# Patient Record
Sex: FEMALE | Race: WHITE | Employment: FULL TIME | URBAN - METROPOLITAN AREA
[De-identification: names, ages, dates, MRNs, and addresses within clinical notes are randomized per-mention and may not be internally consistent; named-entity substitution may affect disease eponyms.]

---

## 2019-07-03 ENCOUNTER — HOSPITAL ENCOUNTER (OUTPATIENT)
Dept: PHYSICAL THERAPY | Age: 59
Discharge: HOME | End: 2019-07-03
Payer: COMMERCIAL

## 2019-07-03 PROCEDURE — 97162 PT EVAL MOD COMPLEX 30 MIN: CPT

## 2019-07-03 PROCEDURE — 97110 THERAPEUTIC EXERCISES: CPT

## 2019-07-03 NOTE — PROGRESS NOTES
Maikel Malcolm [address]  [facility] PHYSICAL THERAPY  [address] [facility] [address], [address] - Phone: [phone]  Fax: [phone] / [address] [facility]  Patient Name: Iris Abraham : 1960   Medical   Diagnosis: Neck pain [M54.2]  Right leg pain [M79.604]  Low back pain [M54.5] Treatment Diagnosis: C/S and L/S radic   Onset Date: Chronic with Exacerbation 2019     Referral Source: Henok Julien MD [facility]): 7/3/2019   Prior Hospitalization: See medical history Provider #: [id number]   Prior Level of Function: Works a teacher, Independent with ADLs   Comorbidities: x2 prior L/S surgeries  and , Migraines    Medications: Verified on Patient Summary List   The Plan of Care and following information is based on the information from the initial evaluation.   ==========================================================================================  Assessment / key information:  Pt is a 61year old female who presents to PT todaywith c/o worsening C/s and L/S since 2019 when she returned to neutral after flipping her head over to dry her hair. Initially pt reported increase in L/S pain which the progressively exacerbated her C/S pain. Pt has a long history of chronic interm spine pain having has x2 prior L/S surgeries in the  and reporting she has been wearing a soft cervical collar interm at night since . Pt also reports interm numbness in her right lateral calf and interm aching in her elbow at night. Pt initially apprehensive of physical therapy stating she was here because her insurance would not approve an MRI and stating \"aren't you afraid to touch me?\" Currently S/S consistent with cervical and lumbar radiculopathy as L/S pain increases with flexion based activities and decreases today with prone lying and lumbar support in sitting.  She is also able to abolish C/S pain with neutral lying in supine. Noted pt with decreased L/S AROM all planes, grossly LE MMT 4+/5 except abd and ext 4/5. C/S AROM flex 45deg, Ext 50deg with pain, Right SB 22deg with pain on right, Left SB 35deg with pull on the right, Right Rotation 60deg and Left Rotatoin 65 deg both with c/s pain. Pt would benefit from skilled PT to address increased pain, decreased strength and ROM, and limited activity tolerance.       FOTO score 52 (L/S) and 47 (C/S) indicating 48%  And 53% functional disability   ==========================================================================================  Eval Complexity: History: MEDIUM  Complexity : 1-2 comorbidities / personal factors will impact the outcome/ POC Exam:MEDIUM Complexity : 3 Standardized tests and measures addressing body structure, function, activity limitation and / or participation in recreation  Presentation: MEDIUM Complexity : Evolving with changing characteristics  Clinical Decision Making:MEDIUM Complexity : FOTO score of 26-74Overall Complexity:MEDIUM    Problem List: pain affecting function, decrease ROM, decrease strength, impaired gait/ balance, decrease ADL/ functional abilitiies, decrease activity tolerance, decrease flexibility/ joint mobility and decrease transfer abilities   Treatment Plan may include any combination of the following: Therapeutic exercise, Therapeutic activities, Neuromuscular re-education, Physical agent/modality, Gait/balance training, Manual therapy, Aquatic therapy, Patient education, Self Care training, Functional mobility training, Home safety training and Stair training  Patient / Family readiness to learn indicated by: asking questions, trying to perform skills and interest  Persons(s) to be included in education: patient (P)  Barriers to Learning/Limitations: None  Measures taken: NA   Patient Goal (s): \"Figure out what's going on\"   Patient self reported health status: fair  Rehabilitation Potential: good   Short Term Goals: To be accomplished in  2  weeks:  1. Pt will be compliant with HEP for symptom management at home. 2. Pt will report >/= 50% improvement in L/S symptoms to indicate increase ease with work tolerance.  Long Term Goals: To be accomplished in  4  weeks:  1. Pt will be independent with HEP at D/C for self management. 2. Pt will increase FS FOTO L/S Score to >/= 59 to indicate a significant decrease in functional disability. 3. Pt will increase FS FOTO C/S Score to >/= 63 to indicate a significant decrease in functional disability. 4. Pt will perform x10 floor to waist box lifts with good mechanics and no increased pain for decreased stress on spine at work. Frequency / Duration:   Patient to be seen  2  times per week for 4  weeks:  Patient / Caregiver education and instruction: self care, activity modification, exercises and other posture    Therapist Signature: Janneth Troy DPT Date: 9/2/1211   Certification Period: NA Time: 12:08 PM   ===========================================================================================  I certify that the above Physical Therapy Services are being furnished while the patient is under my care. I agree with the treatment plan and certify that this therapy is necessary. Physician Signature:        Date:       Time:     Please sign and return to [employer] or you may fax the signed copy to [phone]. Thank you.

## 2019-07-03 NOTE — PROGRESS NOTES
PHYSICAL THERAPY - DAILY TREATMENT NOTE    Patient Name: Deborah Bonilla        Date: 7/3/2019  : 1960   YES Patient  Verified  Visit #:   1   of   8  Insurance: Payor: Ana Paula Meng / Plan: [address] PT / Product Type: Commerical /      In time: 9:05 Out time: 10:15   Total Treatment Time: 70     Medicare/BCBS [place] Time Tracking (below)   Total Timed Codes (min):  15 1:1 Treatment Time:  15     TREATMENT AREA =  C/S, L/S    SUBJECTIVE    Pain Level (on 0 to 10 scale):  5  / 10   Medication Changes/New allergies or changes in medical history, any new surgeries or procedures?     NO    If yes, update Summary List   Subjective Functional Status/Changes:  []  No changes reported     See Eval          OBJECTIVE      15 min Therapeutic Exercise:  [x]  See flow sheet   Rationale:      MDT to improve the patients ability to self manage symptoms and postural education to decrease stress on spine      min Patient Education:  YES  Reviewed HEP   []  Progressed/Changed HEP based on:   Issued HEP     Other Objective/Functional Measures:    See Eval     Post Treatment Pain Level (on 0 to 10) scale:   2  / 10     ASSESSMENT    Assessment/Changes in Function:     Justification for Eval Code Complexity: Moderate  Patient History (low 0, mod 1-2, high 3-4): x2 L/S surgeries, Chronicity   Examination (low 1-2, mod 3+, high 4+): Decreased ROM, Limited positional tolerance, decreased amb tolerance    Clinical Presentation (low: stable/uncomplicated; mod: evolving; high: unstable/unpredictable): Evolving  Clinical Decision Making (low , mod 26-74, high 1-25): FOTO Moderate         []  See Progress Note/Recertification   Patient will continue to benefit from skilled PT services to analyze,, cue,, progress,, modify,, demonstrate,, instruct, and address, movement patterns,, therapeutic interventions,, postural abnormalities,, soft tissue restrictions,, ROM,, strength,, functional mobility,, body mechanics/ergonomics, and home and community integration, to attain remaining goals.    Progress toward goals / Updated goals:    Goals set per POC     PLAN    []  Upgrade activities as tolerated YES Continue plan of care   []  Discharge due to :    []  Other:      Therapist: Rose Aviles DPT    Date: 7/3/2019 Time: 12:01 PM     Future Appointments   Date Time Provider Trace Muñoz   7/24/2019 11:30 AM Santosh Shaw, [facility]   7/26/2019 12:00 PM Katerina Wang, [address] [place] [address]   7/30/2019 10:30 AM Darlene Valdez, PT [facility]

## 2019-07-24 ENCOUNTER — HOSPITAL ENCOUNTER (OUTPATIENT)
Dept: PHYSICAL THERAPY | Age: 59
Discharge: HOME | End: 2019-07-24
Payer: COMMERCIAL

## 2019-07-24 PROCEDURE — 97110 THERAPEUTIC EXERCISES: CPT

## 2019-07-24 NOTE — PROGRESS NOTES
PHYSICAL THERAPY - DAILY TREATMENT NOTE    Patient Name: Donnie Hearn        Date: 2019  : 1960   YES Patient  Verified  Visit #:   2   of   8  Insurance: Payor: Lraon Mention / Plan: [address] PT / Product Type: Commerical /      In time: 11:30 Out time: 12:15   Total Treatment Time: 45     Medicare/BCBS [place] Time Tracking (below)   Total Timed Codes (min):  na 1:1 Treatment Time:  na     TREATMENT AREA =  C/S, L/S    SUBJECTIVE    Pain Level (on 0 to 10 scale):  1  / 10   Medication Changes/New allergies or changes in medical history, any new surgeries or procedures? NO    If yes, update Summary List   Subjective Functional Status/Changes:  []  No changes reported   \"My back and neck have been a little better. No change in the numbness in my foot or the pain in my elbow.  My doctor said I have Tennis elbow\"         OBJECTIVE    45 min Therapeutic Exercise:  [x]  See flow sheet   Rationale:      increase ROM, increase strength and posture to improve the patients ability to perform ADLs with increased ease      min Patient Education:  YES  Reviewed HEP   []  Progressed/Changed HEP based on:   Cont HEP     Other Objective/Functional Measures:  Pts cervical, elbow  And foot numbness all abolished with HL position  (+) extensor wad pain with resisted wrist extension  TTP and extensor wad insertion  Educated (+) onset of tennis elbow likely attributed to radicular RUE symptoms  Educated on extensor wad str and maintaining wrist neutral   Pt reports limited compliance to lumbar roll use     Post Treatment Pain Level (on 0 to 10) scale:   0  / 10     ASSESSMENT    Assessment/Changes in Function:     Minimal reported     []  See Progress Note/Recertification   Patient will continue to benefit from skilled PT services to analyze, cue, progress, modify,, demonstrate, instruct, and address, movement patterns, therapeutic interventions, postural abnormalities, soft tissue restrictions, ROM, strength, functional mobility, body mechanics/ergonomics, and home and community integration, to attain remaining goals.    Progress toward goals / Updated goals:    Not yet fully compliant with HEP     PLAN    []  Upgrade activities as tolerated YES Continue plan of care   []  Discharge due to :    []  Other:      Therapist: Janneth Troy DPT    Date: 7/24/2019 Time: 12:20 PM     Future Appointments   Date Time Provider Trace Wanda   7/26/2019 12:00 PM Meir Goodman [address]   7/30/2019 10:30 AM Conrad Calix [address]   8/2/2019  8:00 AM Meir Goodman, [address]   8/5/2019 11:30 AM Meir Goodman [address]   8/9/2019  9:00 AM Meir Goodman [address]   8/14/2019  9:00 AM Conrad Calix [address]   8/16/2019  9:00 AM Meir Goodman [address]   8/19/2019  2:00 PM Conrad Calix [address]   8/22/2019  9:00 AM Jose Metz, [address]

## 2019-07-26 ENCOUNTER — HOSPITAL ENCOUNTER (OUTPATIENT)
Dept: PHYSICAL THERAPY | Age: 59
Discharge: HOME | End: 2019-07-26
Payer: COMMERCIAL

## 2019-07-26 PROCEDURE — 97110 THERAPEUTIC EXERCISES: CPT

## 2019-07-26 NOTE — PROGRESS NOTES
PHYSICAL THERAPY - DAILY TREATMENT NOTE    Patient Name: Phoebe Brantley        Date: 2019  : 1960   YES Patient  Verified  Visit #:   3   of   8  Insurance: Payor: Jazz De Jesus / Plan: [address] [place] [address] [facility] / Product Type: Commerical /      In time: 11:55 Out time: 12:45   Total Treatment Time: 50     TREATMENT AREA = Neck pain [M54.2]  Right leg pain [M79.604]  Low back pain [M54.5]    SUBJECTIVE    Pain Level (on 0 to 10 scale):  1  / 10   Medication Changes/New allergies or changes in medical history, any new surgeries or procedures? NO    If yes, update Summary List   Subjective Functional Status/Changes:  []  No changes reported     \"My back is better but my neck is the same. It's always a nagging ache. \"   \"I only do the (retractions) once a day. SHould I do them more? \"       OBJECTIVE  Therapeutic Procedures:  Min Procedure Specifics + Rationale   n/a [x]  Patient Education (performed throughout session) [x] Review HEP    [x] Progressed/Changed HEP based on:   [x] proper performance and advancement of Therex/TA   [x] reduction in pain level    [x] increased functional capacity       [] change in directional preference   40 [x] Therapeutic Exercise   [x]  See Flowsheet   Rationale: increase ROM and increase strength to improve the patients ability to participate in ADL's        Modality rationale: decrease inflammation, decrease pain, increase tissue extensibility and increase muscle contraction/control to improve the patients ability to perform ADL's with greater ease     Min Type Additional Details   10 [x]  Cold Pack       [x] post-DARSHAN  []  Ice massage Location:  C/S and L/S  [x] supine              [] prone  [x] legs elevated    [] legs flat   [] sitting   [x] Skin assessment post-treatment:  [x]intact [x]redness- no adverse reaction       []redness  adverse reaction:     Other Objective/Functional Measures:    Educated patient re: use of repeated movements to further address pain control and enhance posture  VC's to maintain form, tempo, and rep count. No worsening of symptoms with treatment progression. Added new therex per flow sheet and advanced HEP     Post Treatment Pain Level (on 0 to 10) scale:   0  / 10     ASSESSMENT    Assessment/Changes in Function:       No worsening of symptoms with treatment progression. Patient will continue to benefit from skilled PT services to modify and progress therapeutic interventions, address functional mobility deficits, address ROM deficits, address strength deficits, analyze and address soft tissue restrictions, analyze and cue movement patterns, analyze and modify body mechanics/ergonomics and instruct in home and community integration  to attain remaining goals   Progress toward goals / Updated goals:    Performing HEP with compliance     PLAN    [x]  Upgrade activities as tolerated  [x]  Update interventions per flow sheet YES Continue plan of care   []  Discharge due to :    []  Other:      Therapist: Bhupinder Levin \"BJ\" Heidi De Jesus, DPT, Cert. MDT, Cert. DN, Cert.  SMT    Date: 7/26/2019 Time: 11:59 AM     Future Appointments   Date Time Provider Trace Muñoz   7/26/2019 12:00 PM Koko Wilkes, [facility]   7/30/2019 10:30 AM Daksha Wright, [facility]   8/2/2019  8:00 AM Koko Wilkes, [facility]   8/5/2019 11:30 AM Koko Wilkes, [facility]   8/9/2019  9:00 AM Koko Wilkes, [facility]   8/14/2019  9:00 AM Daksha Wright, [facility]   8/16/2019  9:00 AM Koko Wilkes, [facility]   8/19/2019  2:00 PM Daksha Wright, [facility]   8/22/2019  9:00 AM Jorge Luis Medrano, [facility]

## 2019-07-30 ENCOUNTER — HOSPITAL ENCOUNTER (OUTPATIENT)
Dept: PHYSICAL THERAPY | Age: 59
Discharge: HOME | End: 2019-07-30
Payer: COMMERCIAL

## 2019-07-30 PROCEDURE — 97110 THERAPEUTIC EXERCISES: CPT

## 2019-07-30 PROCEDURE — 97012 MECHANICAL TRACTION THERAPY: CPT

## 2019-07-30 NOTE — PROGRESS NOTES
PHYSICAL THERAPY - DAILY TREATMENT NOTE    Patient Name: Iris Abraham        Date: 2019  : 1960   YES Patient  Verified  Visit #:   4   of   8  Insurance: Payor: Rafa Mark Anthony / Plan: [address] PT / Product Type: Commerical /      In time: 10:30 Out time: 11:15   Total Treatment Time: 45     Medicare/BCBS [place] Time Tracking (below)   Total Timed Codes (min):  NA 1:1 Treatment Time:  NA     TREATMENT AREA =  C/S, LBP    SUBJECTIVE    Pain Level (on 0 to 10 scale):  0  / 10   Medication Changes/New allergies or changes in medical history, any new surgeries or procedures? NO    If yes, update Summary List   Subjective Functional Status/Changes:  []  No changes reported     \"I was hurting when I left here but then through the weekend I felt good\"          OBJECTIVE    Modalities Rationale:    decrease pain and increase tissue extensibility to improve patient's ability to decrease stress on the spine   min [] Estim, type/location:                                      []  att     []  unatt     []  w/US     []  w/ice    []  w/heat   15 min [x]  Mechanical Traction: type/lbs Max 15lbs, Min 5 lbs.  60sec on and 20sec off  Low angle for MFR                    []  pro   []  sup   [x]  int   []  cont    []  before manual    [x]  after manual    min []  Ultrasound, settings/location:      min []  Iontophoresis w/ dexamethasone, location:                                               []  take home patch-6hour remove at        []  in clinic    min []  Ice     []  Heat    location/position:     min []  Vasopneumatic Device, press/temp:     min []  Other:    [x] Skin assessment post-treatment (if applicable):    [x]  intact    []  redness- no adverse reaction     []redness  adverse reaction:      25 min Therapeutic Exercise:  [x]  See flow sheet   Rationale:      increase ROM and increase strength to improve the patients ability to stress on spine      5 min Manual Therapy: Gentle manual traction Rationale:      decrease pain, increase ROM and increase tissue extensibility to improve patient's ability to trial for benefits of manual traction     min Patient Education:  YES  Reviewed HEP   []  Progressed/Changed HEP based on:   Cont HEP     Other Objective/Functional Measures:    Pt reporting less elbow pain   Increased pain/tension with UBE     Post Treatment Pain Level (on 0 to 10) scale:   1  / 10     ASSESSMENT    Assessment/Changes in Function:     ABle to reach 0/10 pain in neutral   []  See Progress Note/Recertification   Patient will continue to benefit from skilled PT services to analyze, cue, progress, modify,, demonstrate, instruct, and address, movement patterns, therapeutic interventions, postural abnormalities, soft tissue restrictions, ROM, strength, functional mobility, body mechanics/ergonomics, and home and community integration, to attain remaining goals. Progress toward goals / Updated goals: · Short Term Goals: To be accomplished in  2  weeks:  1. Pt will be compliant with HEP for symptom management at home. 2. Pt will report >/= 50% improvement in L/S symptoms to indicate increase ease with work tolerance. · Long Term Goals: To be accomplished in  4  weeks:  1. Pt will be independent with HEP at D/C for self management. 2. Pt will increase FS FOTO L/S Score to >/= 59 to indicate a significant decrease in functional disability. 3. Pt will increase FS FOTO C/S Score to >/= 63 to indicate a significant decrease in functional disability. 4. Pt will perform x10 floor to waist box lifts with good mechanics and no increased pain for decreased stress on spine at work.       PLAN    []  Upgrade activities as tolerated YES Continue plan of care   []  Discharge due to :    []  Other:      Therapist: Mikie Zamarripa DPT    Date: 7/30/2019 Time: 12:49 PM     Future Appointments   Date Time Provider Trace Muñoz   8/2/2019  8:00 AM Zahira Okeefe PT [facility] AT [facility]   8/5/2019 11:30 AM Pattie Russo, PT [address]   8/9/2019  9:00 AM Pattie Russo, [address]   8/14/2019  9:00 AM Stephenie Corral, PT [address]   8/16/2019  9:00 AM Pattie Russo, PT [address]   8/19/2019  2:00 PM Stephenie Corral, PT [address]   8/22/2019  9:00 AM Rachele Valdez, PT [address]

## 2019-08-02 ENCOUNTER — HOSPITAL ENCOUNTER (OUTPATIENT)
Dept: PHYSICAL THERAPY | Age: 59
Discharge: HOME | End: 2019-08-02
Payer: COMMERCIAL

## 2019-08-02 PROCEDURE — 97012 MECHANICAL TRACTION THERAPY: CPT

## 2019-08-02 PROCEDURE — 97110 THERAPEUTIC EXERCISES: CPT

## 2019-08-02 NOTE — PROGRESS NOTES
PHYSICAL THERAPY - DAILY TREATMENT NOTE    Patient Name: Cira Gaspar        Date: 2019  : 1960   YES Patient  Verified  Visit #:      8  Insurance: Payor: Jose Dowd / Plan: [address] PT / Product Type: Commerical /      In time: 7:59 Out time: 8:54   Total Treatment Time: 55     TREATMENT AREA = Neck pain [M54.2]  Right leg pain [M79.604]  Low back pain [M54.5]    SUBJECTIVE    Pain Level (on 0 to 10 scale):  2C/S, L/S  / 10   Medication Changes/New allergies or changes in medical history, any new surgeries or procedures? NO    If yes, update Summary List   Subjective Functional Status/Changes:  []  No changes reported     \"Macario felt fine after I left but I did end up icing it about an hour later. I'm willing to try it again (re: C/S traction)\"   \"The bands at home are easier for the (rows/ext). Can I do more exercises at home? \"     OBJECTIVE  Therapeutic Procedures:  Min Procedure Specifics + Rationale   n/a [x]  Patient Education (performed throughout session) [x] Review HEP    [x] Progressed/Changed HEP based on:   [x] proper performance and advancement of Therex/TA   [] reduction in pain level    [x] increased functional capacity       [] change in directional preference   40 [x] Therapeutic Exercise   [x]  See Flowsheet   Rationale: increase ROM and increase strength to improve the patients ability to participate in ADL's        Modality rationale: decrease inflammation, decrease pain, increase tissue extensibility and increase muscle contraction/control to improve the patients ability to perform ADL's with greater ease     Min Type Additional Details   15 [x]  Traction:    [x] Cervical          []Lumbar  [x] Intermitent      []Continuous Steps up&down: 1up/1down  15lbs max/5lbs min  Angle: 30 Degrees   [x] supine              [] prone  [x] legs elevated    [] legs flat   [x] with MHP to C/S   [] with strap         [] without strap   [x] Skin assessment post-treatment: [x]intact [x]redness- no adverse reaction       []redness  adverse reaction:     Other Objective/Functional Measures:    Updated HEP  Progressed Bird Dogs  Added new therex per flow sheet. Post Treatment Pain Level (on 0 to 10) scale:   0  / 10     ASSESSMENT    Assessment/Changes in Function:     Notable lack of stability while on right LE for bird dogs. Patient will continue to benefit from skilled PT services to modify and progress therapeutic interventions, address functional mobility deficits, address ROM deficits, address strength deficits, analyze and address soft tissue restrictions, analyze and cue movement patterns, analyze and modify body mechanics/ergonomics and instruct in home and community integration  to attain remaining goals   Progress toward goals / Updated goals:    Improving in strength and stability for ADL  s     PLAN    [x]  Upgrade activities as tolerated  [x]  Update interventions per flow sheet YES Continue plan of care   []  Discharge due to :    []  Other:      Therapist: Soren Coon \"BJ\" GABRIELA Carvajal, Cert. MDT, Cert. DN, Cert.  SMT    Date: 8/2/2019 Time: 8:17 AM     Future Appointments   Date Time Provider Trace Muñoz   8/5/2019 11:30 AM Peter Sewell [facility]   8/9/2019  9:00 AM Peter Sewell [facility]   8/14/2019  9:00 AM Deanna Haro [facility]   8/16/2019  9:00 AM Peter Sewell [facility]   8/19/2019  2:00 PM Deanna Haro [facility]   8/22/2019  9:00 AM Slime Ha [facility]

## 2019-08-05 ENCOUNTER — HOSPITAL ENCOUNTER (OUTPATIENT)
Dept: PHYSICAL THERAPY | Age: 59
Discharge: HOME | End: 2019-08-05
Payer: COMMERCIAL

## 2019-08-05 PROCEDURE — 97112 NEUROMUSCULAR REEDUCATION: CPT

## 2019-08-05 PROCEDURE — 97110 THERAPEUTIC EXERCISES: CPT

## 2019-08-05 PROCEDURE — 97012 MECHANICAL TRACTION THERAPY: CPT

## 2019-08-05 NOTE — PROGRESS NOTES
PHYSICAL THERAPY - DAILY TREATMENT NOTE    Patient Name: Nelsy Carrillo        Date: 2019  : 1960   YES Patient  Verified  Visit #:   6     Insurance: Payor: Gurvinder Duong / Plan: [address] [place] [address] [facility] / Product Type: Commerical /      In time: 11:28 Out time: 12:23   Total Treatment Time: 55     TREATMENT AREA = Neck pain [M54.2]  Right leg pain [M79.604]  Low back pain [M54.5]    SUBJECTIVE    Pain Level (on 0 to 10 scale):  2C/S 1L/S  / 10   Medication Changes/New allergies or changes in medical history, any new surgeries or procedures?     NO    If yes, update Summary List   Subjective Functional Status/Changes:  []  No changes reported     \"It's a little tight\"          OBJECTIVE  Therapeutic Procedures:  Min Procedure Specifics + Rationale   n/a [x]  Patient Education (performed throughout session) [x] Review HEP    [] Progressed/Changed HEP based on:   [] proper performance and advancement of Therex/TA   [] reduction in pain level    [] increased functional capacity       [] change in directional preference   30 [x] Therapeutic Exercise   [x]  See Flowsheet   Rationale: increase ROM and increase strength to improve the patients ability to participate in ADL's    10 [x] Neuromuscular Re-ed   [x]  See Flowsheet;  Dead Bug UE/LE progression, Fall Out (Bent Knee/UE Horizontal Abduction) progression    Rationale: increase ROM, increase strength, improve coordination, improve balance and increase proprioception  to improve the patients ability to safely participate in ADL's       Modality rationale: decrease inflammation, decrease pain, increase tissue extensibility and increase muscle contraction/control to improve the patients ability to perform ADL's with greater ease     Min Type Additional Details   15 [x]  Traction:    [x] Cervical          []Lumbar  [x] Intermitent      []Continuous Steps up&down: 1up/1down  15lbs max/5lbs min  Angle: 30 Degrees   [x] supine              [] prone  [x] legs elevated    [] legs flat   [x] with MHP to C/S   [] with strap         [] without strap   [x] Skin assessment post-treatment:  [x]intact [x]redness- no adverse reaction       []redness  adverse reaction:     Other Objective/Functional Measures:    Educated patient re: oov     Post Treatment Pain Level (on 0 to 10) scale:   0L/S, 1C/S  / 10     ASSESSMENT    Assessment/Changes in Function:     Initial report of increased tension to CT junction after OOV, allevitaed by Tx         Patient will continue to benefit from skilled PT services to modify and progress therapeutic interventions, address functional mobility deficits, address ROM deficits, address strength deficits, analyze and address soft tissue restrictions, analyze and cue movement patterns, analyze and modify body mechanics/ergonomics and instruct in home and community integration  to attain remaining goals   Progress toward goals / Updated goals:    Improvement In core stability for bird dogs to allow for increase in ADL tolerance     PLAN    [x]  Upgrade activities as tolerated  [x]  Update interventions per flow sheet YES Continue plan of care   []  Discharge due to :    []  Other:      Therapist: Tarsha Ho \"BJ\" Everrett Kehr, DPT, Cert. MDT, Cert. DN, Cert.  SMT    Date: 8/5/2019 Time: 11:26 AM     Future Appointments   Date Time Provider Trace Muñoz   8/5/2019 11:30 AM Edmar Lou [facility]   8/9/2019  9:00 AM Edmar Lou [facility]   8/14/2019  9:00 AM Homer Mims [facility]   8/15/2019 11:30 AM Edmar Lou [facility]   8/19/2019  2:00 PM Homer Mims [facility]   8/22/2019  9:00 AM Barney Osuna, [facility]

## 2019-08-09 ENCOUNTER — HOSPITAL ENCOUNTER (OUTPATIENT)
Dept: PHYSICAL THERAPY | Age: 59
Discharge: HOME | End: 2019-08-09
Payer: COMMERCIAL

## 2019-08-09 PROCEDURE — 97110 THERAPEUTIC EXERCISES: CPT

## 2019-08-09 PROCEDURE — 97530 THERAPEUTIC ACTIVITIES: CPT

## 2019-08-09 PROCEDURE — 97012 MECHANICAL TRACTION THERAPY: CPT

## 2019-08-09 NOTE — PROGRESS NOTES
PHYSICAL THERAPY - DAILY TREATMENT NOTE    Patient Name: Arsalan Mckeon        Date: 2019  : 1960   YES Patient  Verified  Visit #:   7   of   8  Insurance: Payor: Bailey Alvarado / Plan: [address] [facility] / Product Type: Commerical /      In time: 9:03 Out time: 10:03   Total Treatment Time: 60     TREATMENT AREA = Neck pain [M54.2]  Right leg pain [M79.604]  Low back pain [M54.5]    SUBJECTIVE    Pain Level (on 0 to 10 scale):  1  / 10   Medication Changes/New allergies or changes in medical history, any new surgeries or procedures? NO    If yes, update Summary List   Subjective Functional Status/Changes:  []  No changes reported     \"No back pain, just a little neck. \"          OBJECTIVE  Therapeutic Procedures:  Min Procedure Specifics + Rationale   n/a [x]  Patient Education (performed throughout session) [x] Review HEP    [] Progressed/Changed HEP based on:   [] proper performance and advancement of Therex/TA   [] reduction in pain level    [] increased functional capacity       [] change in directional preference   25 [x] Therapeutic Exercise   [x]  See Flowsheet   Rationale: increase ROM and increase strength to improve the patients ability to participate in ADL's    15 [x] Therapeutic Activity   [x]  See Flowsheet;   KB carries with 5 and 15# weights with varied positions as follows: farmer's carry, racked, bottoms up 90-90, OH. Conventional deadlift in rack (Drag)  Snatch  deadlift in rack  (Drag). 1/2 kneel <-->stand  Rationale:  To improve safety, proprioception, coordination, and efficiency with tasks       Modality rationale: decrease inflammation, decrease pain, increase tissue extensibility and increase muscle contraction/control to improve the patients ability to perform ADL's with greater ease     Min Type Additional Details   15 [x]  Traction:    [x] Cervical          []Lumbar  [x] Intermitent      []Continuous Steps up&down: 1up/1down  15lbs max/5lbs min  Angle: 30 Degrees   [x] supine              [] prone  [x] legs elevated    [] legs flat   [x] with MHP to C/S   [] with strap         [] without strap   [x] Skin assessment post-treatment:  [x]intact [x]redness- no adverse reaction       []redness  adverse reaction:     Other Objective/Functional Measures:    VC's, TC's, and Demo to perform proper hinge      Post Treatment Pain Level (on 0 to 10) scale:   0  / 10     ASSESSMENT    Assessment/Changes in Function:     Increased cervical and lumbar stability noted as patient able to perform TRX at greater angles without difficulty. Patient will continue to benefit from skilled PT services to modify and progress therapeutic interventions, address functional mobility deficits, address ROM deficits, address strength deficits, analyze and address soft tissue restrictions, analyze and cue movement patterns, analyze and modify body mechanics/ergonomics, assess and modify postural abnormalities and instruct in home and community integration  to attain remaining goals   Progress toward goals / Updated goals:    Progressing well in functional mobility without recurrence of radicular symptoms in RLE     PLAN    [x]  Upgrade activities as tolerated  [x]  Update interventions per flow sheet YES Continue plan of care   []  Discharge due to :    []  Other:      Therapist: David Storm \"BJ\" Deisy Chan DPT, Cert. MDT, Cert. DN, Cert.  SMT    Date: 8/9/2019 Time: 9:24 AM     Future Appointments   Date Time Provider Trace Muñoz   8/14/2019  9:00 AM Gabo Forde [facility]   8/15/2019 11:30 AM Tosin Mireles [facility]   8/19/2019  2:00 PM Gabo Forde [facility]   8/22/2019  9:00 AM Karen Tom [facility]

## 2019-08-14 ENCOUNTER — HOSPITAL ENCOUNTER (OUTPATIENT)
Dept: GENERAL RADIOLOGY | Age: 59
Discharge: HOME | End: 2019-08-14
Payer: COMMERCIAL

## 2019-08-14 ENCOUNTER — HOSPITAL ENCOUNTER (OUTPATIENT)
Dept: PHYSICAL THERAPY | Age: 59
Discharge: HOME | End: 2019-08-14
Payer: COMMERCIAL

## 2019-08-14 DIAGNOSIS — M54.50 LUMBAGO: ICD-10-CM

## 2019-08-14 DIAGNOSIS — M54.2 CERVICALGIA: ICD-10-CM

## 2019-08-14 PROCEDURE — 97012 MECHANICAL TRACTION THERAPY: CPT

## 2019-08-14 PROCEDURE — 97110 THERAPEUTIC EXERCISES: CPT

## 2019-08-14 PROCEDURE — 72050 X-RAY EXAM NECK SPINE 4/5VWS: CPT

## 2019-08-14 PROCEDURE — 72110 X-RAY EXAM L-2 SPINE 4/>VWS: CPT

## 2019-08-14 NOTE — PROGRESS NOTES
PHYSICAL THERAPY - DAILY TREATMENT NOTE    Patient Name: Anh Giron        Date: 2019  : 1960   YES Patient  Verified  Visit #:   8   of   8  Insurance: Payor: Gretel Kirkland / Plan: [address] PT / Product Type: Commerical /      In time: 9:00 Out time: 9:45   Total Treatment Time: 45     Medicare/BCBS [place] Time Tracking (below)   Total Timed Codes (min):  na 1:1 Treatment Time:  na     TREATMENT AREA =  C/S, L/S    SUBJECTIVE    Pain Level (on 0 to 10 scale):  1  / 10   Medication Changes/New allergies or changes in medical history, any new surgeries or procedures? NO    If yes, update Summary List   Subjective Functional Status/Changes:  []  No changes reported     \"I saw the neurologist yesterday and he said keep doing what your doing. \"          OBJECTIVE    Min Type Additional Details   15 [x]  Traction:    [x] Cervical          []Lumbar  [x] Intermitent      []Continuous Steps up&down: 1up/1down  15lbs max/5lbs min  Angle: 30 Degrees   [x] supine              [] prone  [x] legs elevated    [] legs flat   [x] with MHP to C/S   [] with strap         [] without strap   [x] Skin assessment post-treatment:  [x]intact [x]redness- no adverse reaction       []redness  adverse reaction:       30 min Therapeutic Exercise:  [x]  See flow sheet   Rationale:      increase ROM, increase strength and reassess to improve the patients ability to assess progress with PT      min Patient Education:  YES  Reviewed HEP   []  Progressed/Changed HEP based on:   Cont HEP     Other Objective/Functional Measures:  C/S improvement- 75% intensity and frequency improved    Nw swimming and able to breathe to both sides   Cont pain with driving but overall better (only 50% improvement)    L/S 80% improvement-numbness is a lot better.      C/S Flex 45deg, 50 with pain, SBR 45deg, SBL 35deg, Rot R 70deg, Rot L 70deg   Post Treatment Pain Level (on 0 to 10) scale:   0  / 10     ASSESSMENT    Assessment/Changes in Function:     See MD note     []  See Progress Note/Recertification   Patient will continue to benefit from skilled PT services to analyze, cue, progress, modify,, demonstrate, instruct, and address, movement patterns, therapeutic interventions, postural abnormalities, soft tissue restrictions, ROM, strength, functional mobility, body mechanics/ergonomics, and home and community integration, to attain remaining goals. Progress toward goals / Updated goals: · Short Term Goals: To be accomplished in  2  weeks:  1. Pt will be compliant with HEP for symptom management at home. 2. Pt will report >/= 50% improvement in L/S symptoms to indicate increase ease with work tolerance. Met-reports 80% improvement  · Long Term Goals: To be accomplished in  4  weeks:  1. Pt will be independent with HEP at D/C for self management. 2. Pt will increase FS FOTO L/S Score to >/= 59 to indicate a significant decrease in functional disability. 3. Pt will increase FS FOTO C/S Score to >/= 63 to indicate a significant decrease in functional disability. 4. Pt will perform x10 floor to waist box lifts with good mechanics and no increased pain for decreased stress on spine at work.       PLAN    []  Upgrade activities as tolerated YES Continue plan of care   []  Discharge due to :    []  Other:      Therapist: Moises Casillas DPT    Date: 8/14/2019 Time: 9:01 AM     Future Appointments   Date Time Provider Trace Muñoz   8/15/2019 11:30 AM Sofia Rao, [address] [place] [address]   8/19/2019  2:00 PM Aiyana Martinez, PT [facility]   8/22/2019  9:00 AM Lili Valera, PT [facility]

## 2019-08-14 NOTE — PROGRESS NOTES
[address] [facility] PHYSICAL THERAPY  [address] [facility] [address], [address] - Phone: [phone]  Fax: [phone]  Progress Note/CONTINUED PLAN OF CARE for PHYSICAL THERAPY          Patient Name: Albert Fagan : 1960   Treatment/Medical Diagnosis: Neck pain [M54.2]  Right leg pain [M79.604]  Low back pain [M54.5]   Onset Date: Chronic with Exacerbation 2019    Referral Source: Eze Newell MD Start of [facility]): 7/3/19   Prior Hospitalization: See Medical History Provider #: [id number]   Prior Level of Function: Works a teacher, Independent with ADLs   Comorbidities: x2 prior L/S surgeries  and , Migraines    Medications: Verified on Patient Summary List   Visits from [facility]: 8 Missed Visits: 0     Goal/Measure of Progress Goal Met? 1. Pt will report >/= 50% improvement in L/S symptoms to indicate increase ease with work tolerance. Status at last Eval: NA Current Status: Reports 80% improvement yes   2. Pt will increase FS FOTO L/S Score to >/= 59 to indicate a significant decrease in functional disability. Status at last Eval: 52 Current Status: 56 progressing   3. Pt will increase FS FOTO C/S Score to >/= 63 o indicate a significant decrease in functional disability   Status at last Eval: 47 Current Status: 62 nearly   4. Pt will be compliant with HEP for symptom management at home. Status at last Eval: NA Current Status: Reports compliance yes     Key Functional Changes/Progress: Pt has completed x8 visits of PT consisting of MDT, active warm-up, postural education and ergonomics, strengthening an flexibility.    Currently: C/S improvement- 75% intensity and frequency improved   She is now able to return to swimming and is able to breathe to both sides   She does Cont to experience discomfort with driving but overall better (only 50% improvement)  L/S 80% improvement with noted in LE/foot numbness much less.      C/S Flex 45deg, 50 with pain, SBR 45deg, SBL 35deg, Rot R 70deg, Rot L 70deg  Problem List: pain affecting function, decrease strength, impaired gait/ balance, decrease ADL/ functional abilitiies, decrease activity tolerance, decrease flexibility/ joint mobility and decrease transfer abilities   Treatment Plan may include any combination of the following: Therapeutic exercise, Therapeutic activities, Neuromuscular re-education, Physical agent/modality, Gait/balance training, Manual therapy, Aquatic therapy, Patient education, Self Care training, Functional mobility training, Home safety training and Stair training  Patient Goal(s) has been updated and includes:  Same as [facility]    Goals for this certification period include and are to be achieved in  4 weeks:  1. Pt will be independent with HEP at D/C for self management. 2. Pt will report the ability to amb 1 mile without increased pain for return to recreation. 3.  Pt will increase FS FOTO L/S Score to >/= 59 to indicate a significant decrease in functional disability  4. Pt will increase FS FOTO C/S Score to >/= 63 to indicate a significant decrease in functional disability  Frequency / Duration:   Patient to be seen   2   times per week for   4    weeks:    Assessments/Recommendations: Pt would benefit from continued skilled PT to address continued form and mechanics with ADLs as well as address decreased strength with emphasis on transition to self management. If you have any questions/comments please contact us directly at [phone]. Thank you for allowing us to assist in the care of your patient. Therapist Signature: Bob Box DPT Date: 3/60/6700   Certification Period:  Reporting Period: NA  NA Time: 9:39 AM   NOTE TO PHYSICIAN:  PLEASE COMPLETE THE ORDERS BELOW AND FAX TO   [facility] Physical Therapy: [phone].   If you are unable to process this request in 24 hours please contact our office: [phone].    ___ I have read the above report and request that my patient continue as recommended.   ___ I have read the above report and request that my patient continue therapy with the following changes/special instructions: ________________________________________________   ___ I have read the above report and request that my patient be discharged from therapy.      Physician Signature:        Date:       Time:

## 2019-08-15 ENCOUNTER — HOSPITAL ENCOUNTER (OUTPATIENT)
Dept: PHYSICAL THERAPY | Age: 59
Discharge: HOME | End: 2019-08-15
Payer: COMMERCIAL

## 2019-08-15 PROCEDURE — 97530 THERAPEUTIC ACTIVITIES: CPT

## 2019-08-15 PROCEDURE — 97110 THERAPEUTIC EXERCISES: CPT

## 2019-08-15 PROCEDURE — 97012 MECHANICAL TRACTION THERAPY: CPT

## 2019-08-15 NOTE — PROGRESS NOTES
PHYSICAL THERAPY - DAILY TREATMENT NOTE    Patient Name: Arsalan Mckeon        Date: 8/15/2019  : 1960   YES Patient  Verified  Visit #:     Insurance: Payor: Bailey Alvarado / Plan: [address] / Product Type: Commerical /      In time: 11:25 Out time: 12:15   Total Treatment Time: 50     TREATMENT AREA = Neck pain [M54.2]  Right leg pain [M79.604]  Low back pain [M54.5]    SUBJECTIVE    Pain Level (on 0 to 10 scale):  1  / 10   Medication Changes/New allergies or changes in medical history, any new surgeries or procedures? NO    If yes, update Summary List   Subjective Functional Status/Changes:  []  No changes reported     \"I'm ready to work. \"          OBJECTIVE  Therapeutic Procedures:  Min Procedure Specifics + Rationale   n/a [x]  Patient Education (performed throughout session) [x] Review HEP    [] Progressed/Changed HEP based on:   [] proper performance and advancement of Therex/TA   [] reduction in pain level    [] increased functional capacity       [] change in directional preference   25 [x] Therapeutic Exercise   [x]  See Flowsheet   Rationale: increase ROM and increase strength to improve the patients ability to participate in ADL's    10 [x] Therapeutic Activity   [x]  See Flowsheet; Loaded carries with KB in varied hand positions: farmer, rack, bottom's up, OH bottom's up. Rack deadlift (RDL). Rationale:  To improve safety, proprioception, coordination, and efficiency with tasks       Modality rationale: decrease inflammation, decrease pain, increase tissue extensibility and increase muscle contraction/control to improve the patients ability to perform ADL's with greater ease     Min Type Additional Details   15 [x]  Traction:    [x] Cervical          []Lumbar  [x] Intermitent      []Continuous Steps up&down: 1up/1down  15lbs max/5lbs min  Angle: 30 Degrees   [x] supine              [] prone  [x] legs elevated    [] legs flat   [x] with MHP to C/S   [] with strap         [] without strap   [x] Skin assessment post-treatment:  [x]intact [x]redness- no adverse reaction       []redness  adverse reaction:     Other Objective/Functional Measures:    Advanced all therex to focus on standing core stability  Introduced hardstyle planks to promote core stability     Post Treatment Pain Level (on 0 to 10) scale:   0  / 10     ASSESSMENT    Assessment/Changes in Function:     Improved shoulder stability noted with bottom's up KB carries         Patient will continue to benefit from skilled PT services to modify and progress therapeutic interventions, address functional mobility deficits, address ROM deficits, address strength deficits, analyze and address soft tissue restrictions, analyze and cue movement patterns, analyze and modify body mechanics/ergonomics and instruct in home and community integration  to attain remaining goals   Progress toward goals / Updated goals:    1st session since progress note. No significant progress observed       PLAN    [x]  Upgrade activities as tolerated  [x]  Update interventions per flow sheet YES Continue plan of care   []  Discharge due to :    []  Other:      Therapist: Rozina Ontiveros \"BJ\" GABRIELA Carvajal, Cert. MDT, Cert. DN, Cert.  SMT    Date: 8/15/2019 Time: 11:47 AM     Future Appointments   Date Time Provider Trace Muñoz   8[facility]2019  [facility]   8/22/2019  9:00 AM Zabrina Best, PT [facility]   8/29/2019  4:30 PM David Dorsey, PT [facility]

## 2019-08-16 ENCOUNTER — APPOINTMENT (OUTPATIENT)
Dept: PHYSICAL THERAPY | Age: 59
End: 2019-08-16
Payer: COMMERCIAL

## 2019-08-19 ENCOUNTER — HOSPITAL ENCOUNTER (OUTPATIENT)
Dept: PHYSICAL THERAPY | Age: 59
Discharge: HOME | End: 2019-08-19
Payer: COMMERCIAL

## 2019-08-19 PROCEDURE — 97110 THERAPEUTIC EXERCISES: CPT

## 2019-08-19 PROCEDURE — 97012 MECHANICAL TRACTION THERAPY: CPT

## 2019-08-19 NOTE — PROGRESS NOTES
PHYSICAL THERAPY - DAILY TREATMENT NOTE    Patient Name: Arsalan Mckeon        Date: 2019  : 1960   YES Patient  Verified  Visit #:   10   of   16  Insurance: Payor: Bailey Alvarado / Plan: [address] PT / Product Type: Commerical /      In time: 2:00 Out time: 2:45   Total Treatment Time: 45     Medicare/BCBS [place] Time Tracking (below)   Total Timed Codes (min):  na 1:1 Treatment Time:  na     TREATMENT AREA =  C/S    SUBJECTIVE    Pain Level (on 0 to 10 scale):  0  / 10   Medication Changes/New allergies or changes in medical history, any new surgeries or procedures? NO    If yes, update Summary List   Subjective Functional Status/Changes:  []  No changes reported     \"I dont know which ones are working but all of it seems to be. \"          OBJECTIVE     Modalities Rationale:    decrease inflammation, decrease pain and increase tissue extensibility to improve patient's ability to maximize pain free mobility   min [] Estim, type/location:                                      []  att     []  unatt     []  w/US     []  w/ice    []  w/heat   15 min [x]  Mechanical Traction: type/lbs Angle 30deg, 60sec on and 20sec off, Max 15lbs, Min 5 lbs with MHP                  []  pro   [x]  sup   [x]  int   []  cont    []  before manual    []  after manual    min []  Ultrasound, settings/location:      min []  Iontophoresis w/ dexamethasone, location:                                               []  take home patch-6hour remove at        []  in clinic    min []  Ice     []  Heat    location/position:     min []  Vasopneumatic Device, press/temp:     min []  Other:    [x] Skin assessment post-treatment (if applicable):    [x]  intact    []  redness- no adverse reaction     []redness  adverse reaction:      30 min Therapeutic Exercise:  [x]  See flow sheet   Rationale:      increase ROM and increase strength to improve the patients ability to perform ADLs and work duties without pain       min Patient Education:  YES  Reviewed HEP   []  Progressed/Changed HEP based on:   Cont HEP     Other Objective/Functional Measures: Added New TE per flow sheet  Pt challenged with SB M and T due to paraspinal fatigue  VC throughout for decreased UT tension     Post Treatment Pain Level (on 0 to 10) scale:   0  / 10     ASSESSMENT    Assessment/Changes in Function:     Progressing well     []  See Progress Note/Recertification   Patient will continue to benefit from skilled PT services to analyze, cue, progress, modify,, demonstrate, instruct, and address, movement patterns, therapeutic interventions, postural abnormalities, soft tissue restrictions, ROM, strength, functional mobility, body mechanics/ergonomics, and home and community integration, to attain remaining goals. Progress toward goals / Updated goals:     Increasing paraspinal strength for upright amb tolerance     PLAN    []  Upgrade activities as tolerated YES Continue plan of care   []  Discharge due to :    []  Other:      Therapist: Dell Ross DPT    Date: 8/19/2019 Time: 2:35 PM     Future Appointments   Date Time Provider Trace Muñoz   8/22/2019  9:00 AM Elio Peabody, [facility]   8/29/2019  4:30 PM Leala Bran, [facility]   9/3/2019  4:30 PM Leala Bran, [facility]   9/5/2019  4:30 PM Leala Bran, [facility]   9/10/2019  4:30 PM Leala Bran, [facility]   9/12/2019  4:30 PM Leala Bran, [facility]   9/17/2019  4:30 PM Leala Bran, [facility]   9/19/2019  5:00 PM Leala Bran, [address] [place] [address]   9/24/2019  4:30 PM Leala Bran, [facility]   9/25/2019  4:30 PM Kenisha Blood, [facility]

## 2019-08-22 ENCOUNTER — HOSPITAL ENCOUNTER (OUTPATIENT)
Dept: PHYSICAL THERAPY | Age: 59
Discharge: HOME | End: 2019-08-22
Payer: COMMERCIAL

## 2019-08-22 PROCEDURE — 97012 MECHANICAL TRACTION THERAPY: CPT

## 2019-08-22 PROCEDURE — 97110 THERAPEUTIC EXERCISES: CPT

## 2019-08-22 NOTE — PROGRESS NOTES
PHYSICAL THERAPY - DAILY TREATMENT NOTE    Patient Name: Tony Hurtado        Date: 2019  : 1960   YES Patient  Verified  Visit #:     Insurance: Payor: Jessica Vasquez / Plan: [address] [place] [employer] / Product Type: HMO /      In time: 9:00 Out time: 9:50   Total Treatment Time: 50     Medicare/BCBS [place] Time Tracking (below)   Total Timed Codes (min):  NA 1:1 Treatment Time:  NA     TREATMENT AREA =  C/S and L/S    SUBJECTIVE    Pain Level (on 0 to 10 scale):  2  / 10   Medication Changes/New allergies or changes in medical history, any new surgeries or procedures? NO    If yes, update Summary List   Subjective Functional Status/Changes:  []  No changes reported     \"I was good on Monday, I got a massage, I was good at work on [employer] and then after work on tues I over sis it. I had to mop up water in my husbands aunt basement and it was too much. Yuni been sore since then.  A little better today\"         OBJECTIVE    Modalities Rationale:    decrease inflammation, decrease pain and increase tissue extensibility to improve patient's ability to maximize pain free mobility                min [] Estim, type/location:                                                            []  att     []  unatt     []  w/US     []  w/ice    []  w/heat   15 min [x]  Mechanical Traction: type/lbs Angle 30deg, 60sec on and 20sec off, Max 15lbs, Min 5 lbs with MHP                  []  pro   [x]  sup   [x]  int   []  cont    []  before manual    []  after manual     min []  Ultrasound, settings/location:        min []  Iontophoresis w/ dexamethasone, location:                                                []  take home patch-6hour remove at        []  in clinic     min []  Ice     []  Heat    location/position:       min []  Vasopneumatic Device, press/temp:       min []  Other:     [x] Skin assessment post-treatment (if applicable):    [x]  intact    []  redness- no adverse reaction     []redness  adverse reaction: 35 min Therapeutic Exercise:  [x]  See flow sheet   Rationale:      increase ROM and increase strength to improve the patients ability to decrease tension and lessen pain      min Patient Education:  YES  Reviewed HEP   []  Progressed/Changed HEP based on:   Cont HEP, added open book     Other Objective/Functional Measures:    Held lifting mechanics today due to increased pain. Pt reported pain with UT str today, changed to loaded to avoid C/S CB     Post Treatment Pain Level (on 0 to 10) scale:   2  / 10     ASSESSMENT    Assessment/Changes in Function:     Flare up today limiting progression     []  See Progress Note/Recertification   Patient will continue to benefit from skilled PT services to analyze, cue, progress, modify,, demonstrate, instruct, and address, movement patterns, therapeutic interventions, postural abnormalities, soft tissue restrictions, ROM, strength, functional mobility, body mechanics/ergonomics, and home and community integration, to attain remaining goals.    Progress toward goals / Updated goals:    Plan to add limited lifting mechanics NV     PLAN    []  Upgrade activities as tolerated YES Continue plan of care   []  Discharge due to :    []  Other:      Therapist: Brandy Duverney, DPT    Date: 8/22/2019 Time: 10:06 AM     Future Appointments   Date Time Provider Trace Muñoz   8/29/2019  4:30 PM [facility]   9/3/2019  4:30 PM Hector Hughes, [address] [place] [address]   9/5/2019  4:30 PM Hector Hughes, [facility]   9/10/2019  4:30 PM Hector Hughes, [address] [place] [address]   9/12/2019  4:30 PM Hector Hughes, [facility]   9/17/2019  4:30 PM Hector Hughes, [facility]   9/19/2019  5:00 PM Hector Hughes, [address] [place] [address]   9/24/2019  4:30 PM Hector Hughes, [facility]   9/25/2019  4:30 PM Antonino Marsh [facility]

## 2019-08-29 ENCOUNTER — HOSPITAL ENCOUNTER (OUTPATIENT)
Dept: PHYSICAL THERAPY | Age: 59
Discharge: HOME | End: 2019-08-29
Payer: COMMERCIAL

## 2019-08-29 PROCEDURE — 97530 THERAPEUTIC ACTIVITIES: CPT

## 2019-08-29 PROCEDURE — 97110 THERAPEUTIC EXERCISES: CPT

## 2019-08-29 PROCEDURE — 97012 MECHANICAL TRACTION THERAPY: CPT

## 2019-08-29 NOTE — PROGRESS NOTES
PHYSICAL THERAPY - DAILY TREATMENT NOTE    Patient Name: Arsalan Mckeon        Date: 2019  : 1960   YES Patient  Verified  Visit #:   +  Insurance: Payor: Bailey Alvarado / Plan: Neil Zarco / Product Type: JENNA /      In time: 4:25 Out time: 5:20   Total Treatment Time: 55     TREATMENT AREA = Neck pain [M54.2]  Right leg pain [M79.604]  Low back pain [M54.5]    SUBJECTIVE    Pain Level (on 0 to 10 scale):  1  / 10   Medication Changes/New allergies or changes in medical history, any new surgeries or procedures? NO    If yes, update Summary List   Subjective Functional Status/Changes:  []  No changes reported     \"Feeling pretty good. \"          OBJECTIVE  Therapeutic Procedures:  Min Procedure Specifics + Rationale   n/a [x]  Patient Education (performed throughout session) [x] Review HEP    [] Progressed/Changed HEP based on:   [] proper performance and advancement of Therex/TA   [] reduction in pain level    [] increased functional capacity       [] change in directional preference   30 [x] Therapeutic Exercise   [x]  See Flowsheet   Rationale: increase ROM and increase strength to improve the patients ability to participate in ADL's    10 [x] Therapeutic Activity   [x]  See Flowsheet;   KB loaded carry with 5# - bottom's up racked @ flexion and abduction, farmer's walk  Rationale:  To improve safety, proprioception, coordination, and efficiency with tasks       Modality rationale: decrease inflammation, decrease pain, increase tissue extensibility and increase muscle contraction/control to improve the patients ability to perform ADL's with greater ease     Min Type Additional Details   15 [x]  Traction:    [x] Cervical          []Lumbar  [x] Intermitent      []Continuous Steps up&down: 1up/1down  15lbs max/5lbs min  Angle: 30 Degrees   [x] supine              [] prone  [x] legs elevated    [] legs flat   [x] with MHP to C/S   [] with strap         [] without strap   [x] Skin assessment post-treatment:  [x]intact [x]redness- no adverse reaction       []redness  adverse reaction:     Other Objective/Functional Measures:    Performed loaded carries with less difficulty and no exaceration     Post Treatment Pain Level (on 0 to 10) scale:   0  / 10     ASSESSMENT    Assessment/Changes in Function:     Tolerated treatment well without complaints of progression, indicating improved functional capacity for ADL's. Patient will continue to benefit from skilled PT services to modify and progress therapeutic interventions, address functional mobility deficits, address ROM deficits, address strength deficits, analyze and address soft tissue restrictions, analyze and cue movement patterns, analyze and modify body mechanics/ergonomics and instruct in home and community integration  to attain remaining goals   Progress toward goals / Updated goals:    Steady progression in relief of symptoms while increasing work load/capacity for Latha Broussard     PLAN    [x]  Upgrade activities as tolerated  [x]  Update interventions per flow sheet YES Continue plan of care   []  Discharge due to :    []  Other:      Therapist: Soren Coon \"BJ\" Elda Salter, DPT, Cert. MDT, Cert. DN, Cert.  SMT    Date: 8/29/2019 Time: 4:27 PM     Future Appointments   Date Time Provider Trace Muñoz   8/29/2019  4:30 PM Peter Sewell [facility]   9/3/2019  4:30 PM Peter Sewell PT [facility]   9/5/2019  4:30 PM Peter Sewell PT [facility]   9/10/2019  4:30 PM Peter Sewell PT [facility]   9/12/2019  4:30 PM Peter Sewell PT [facility]   9/17/2019  4:30 PM Peter Sewell PT [facility]   9/19/2019  5:00 PM Peter Sewell [facility]   9/24/2019  4:30 PM Peter Sewell [facility]   9/25/2019  4:30 PM Sherrie Castaneda [facility]

## 2019-09-03 ENCOUNTER — HOSPITAL ENCOUNTER (OUTPATIENT)
Dept: PHYSICAL THERAPY | Age: 59
Discharge: HOME | End: 2019-09-03
Payer: COMMERCIAL

## 2019-09-03 PROCEDURE — 97110 THERAPEUTIC EXERCISES: CPT

## 2019-09-03 PROCEDURE — 97012 MECHANICAL TRACTION THERAPY: CPT

## 2019-09-03 NOTE — PROGRESS NOTES
PHYSICAL THERAPY - DAILY TREATMENT NOTE    Patient Name: Katlyn Joe        Date: 9/3/2019  : 1960   YES Patient  Verified  Visit #:   15   +  Insurance: Payor: Omid Escobar / Plan: Angela Hansen / Product Type: JENNA /      In time: 4:30 Out time: 5:30   Total Treatment Time: 60     TREATMENT AREA = Neck pain [M54.2]  Right leg pain [M79.604]  Low back pain [M54.5]    SUBJECTIVE    Pain Level (on 0 to 10 scale):  0-1  / 10   Medication Changes/New allergies or changes in medical history, any new surgeries or procedures? NO    If yes, update Summary List   Subjective Functional Status/Changes:  []  No changes reported     \"I survived the first day of school today.  \"          OBJECTIVE  Therapeutic Procedures:  Min Procedure Specifics + Rationale   n/a [x]  Patient Education (performed throughout session) [x] Review HEP    [] Progressed/Changed HEP based on:   [] proper performance and advancement of Therex/TA   [] reduction in pain level    [] increased functional capacity       [] change in directional preference   45 [x] Therapeutic Exercise   [x]  See Flowsheet   Rationale: increase ROM and increase strength to improve the patients ability to participate in ADL's        Modality rationale: decrease inflammation, decrease pain, increase tissue extensibility and increase muscle contraction/control to improve the patients ability to perform ADL's with greater ease     Min Type Additional Details   15 [x]  Traction:    [x] Cervical          []Lumbar  [x] Intermitent      []Continuous Steps up&down: 1up/1down  15lbs max/5lbs min  Angle: 30 Degrees   [x] supine              [] prone  [x] legs elevated    [] legs flat   [x] with MHP to C/S   [] with strap         [] without strap   [x] Skin assessment post-treatment:  [x]intact [x]redness- no adverse reaction       []redness  adverse reaction:     Other Objective/Functional Measures:    Advanced therex per flow sheet     Post Treatment Pain Level (on 0 to 10) scale:   0  / 10     ASSESSMENT    Assessment/Changes in Function:     Generalized fatigue but no exacerbation         Patient will continue to benefit from skilled PT services to modify and progress therapeutic interventions, address functional mobility deficits, address ROM deficits, address strength deficits, analyze and address soft tissue restrictions, analyze and cue movement patterns, analyze and modify body mechanics/ergonomics and instruct in home and community integration  to attain remaining goals   Progress toward goals / Updated goals:    Progressing well in level of stability to allow for return to normal work duties. PLAN    [x]  Upgrade activities as tolerated  [x]  Update interventions per flow sheet YES Continue plan of care   []  Discharge due to :    []  Other:      Therapist: Quiana Wallace \"BJ\" GABRIELA Carvajal, Cert. MDT, Cert. DN, Cert.  SMT    Date: 9/3/2019 Time: 4:46 PM     Future Appointments   Date Time Provider Trace Muñoz   9/5/2019  4:30 PM Angel Tristna, [facility]   9/10/2019  4:30 PM Angel Tristan, [facility]   9/12/2019  4:30 PM Angel Tristan, [facility]   9/17/2019  4:30 PM Angel Tristan, [facility]   9/19/2019  5:00 PM Angel Tristan, [facility]   9/24/2019  4:30 PM Angel Tristan, [facility]   9/25/2019  4:30 PM Kerline Rao [facility]

## 2019-09-05 ENCOUNTER — APPOINTMENT (OUTPATIENT)
Dept: PHYSICAL THERAPY | Age: 59
End: 2019-09-05
Payer: COMMERCIAL

## 2019-09-10 ENCOUNTER — HOSPITAL ENCOUNTER (OUTPATIENT)
Dept: PHYSICAL THERAPY | Age: 59
Discharge: HOME | End: 2019-09-10
Payer: COMMERCIAL

## 2019-09-10 PROCEDURE — 97140 MANUAL THERAPY 1/> REGIONS: CPT

## 2019-09-10 PROCEDURE — 97014 ELECTRIC STIMULATION THERAPY: CPT

## 2019-09-10 PROCEDURE — 97110 THERAPEUTIC EXERCISES: CPT

## 2019-09-10 PROCEDURE — 97530 THERAPEUTIC ACTIVITIES: CPT

## 2019-09-10 NOTE — PROGRESS NOTES
PHYSICAL THERAPY - DAILY TREATMENT NOTE    Patient Name: Naty Magallanes        Date: 9/10/2019  : 1960   YES Patient  Verified  Visit #:   15   of   16  Insurance: Payor: Keon Man / Plan: Sona Diallo / Product Type: JENNA /      In time: 4:30 Out time: 5:25   Total Treatment Time: 55     TREATMENT AREA = Neck pain [M54.2]  Right leg pain [M79.604]  Low back pain [M54.5]    SUBJECTIVE    Pain Level (on 0 to 10 scale):  1  / 10   Medication Changes/New allergies or changes in medical history, any new surgeries or procedures? NO    If yes, update Summary List   Subjective Functional Status/Changes:  []  No changes reported     \"I'm doing pretty good. \"          OBJECTIVE  Therapeutic Procedures:  Min Procedure Specifics + Rationale   n/a [x]  Patient Education (performed throughout session) [x] Review HEP    [] Progressed/Changed HEP based on:   [] proper performance and advancement of Therex/TA   [] reduction in pain level    [] increased functional capacity       [] change in directional preference   10 - Therapeutic Exercise   -  See Flowsheet   Rationale: increase ROM and increase strength to improve the patients ability to participate in ADL's    10 [x]  Manual Therapy [] IASTM  (See Table Below)  [] TDN (see objective; actual needle insertion time not billed)   REIL with OP; Extension mobilization in prone    Rationale: decrease pain, increase ROM, increase tissue extensibility, decrease trigger points and increase postural awareness to attain functional use and participation with ADL's  [x] Skin assessment post-treatment:  [x]intact []redness- no adverse reaction   []redness  adverse reaction:    20 [x] Therapeutic Activity   [x]  See Flowsheet;   Quadruped rock/rolling  Rationale:  To improve safety, proprioception, coordination, and efficiency with tasks       Modality rationale: decrease inflammation, decrease pain, increase tissue extensibility and increase muscle contraction/control to improve the patients ability to perform ADL's with greater ease     Min Type Additional Details   15 [x] E-Stim Type:  IFC  Attended? NO         Location:L/S  [] supine              [x] prone  [x] legs elevated   [] legs flat  [x]w/heat  []w/ice  [] sitting   10 [x]  Heat                 [x] post-DARSHAN Location:  C/S  [] supine              [x] prone  [x] legs elevated    [] legs flat   [] sitting   [x] Skin assessment post-treatment:  [x]intact [x]redness- no adverse reaction       []redness  adverse reaction:     Other Objective/Functional Measures:    Report of L/S pain after quadruped rocks. Alleviated by manual.   Advised patient to utilize bird dogs and planks should pain return between now and next session     Post Treatment Pain Level (on 0 to 10) scale:   0  / 10     ASSESSMENT    Assessment/Changes in Function:     L/S exacerbated by rocking from extension to \"child's pose\" position. Derangement not fully reduced. Patient will continue to benefit from skilled PT services to modify and progress therapeutic interventions, address functional mobility deficits, address ROM deficits, address strength deficits, analyze and address soft tissue restrictions, analyze and cue movement patterns, analyze and modify body mechanics/ergonomics and instruct in home and community integration  to attain remaining goals   Progress toward goals / Updated goals:    Exacerbation hinders progression     PLAN    [x]  Upgrade activities as tolerated  [x]  Update interventions per flow sheet YES Continue plan of care   []  Discharge due to :    []  Other:      Therapist: Tarsha Ho \"BJ\" GABRIELA Carvajal, Cert. MDT, Cert. LONI, Cert.  SMT    Date: 9/10/2019 Time: 4:26 PM     Future Appointments   Date Time Provider Trace Muñoz   9/10/2019  4:30 PM Edmar Lou PT [facility]   9/12/2019  4:30 PM Edmar Lou PT [facility]   9/17/2019  4:30 PM Edmar Lou PT [facility]   9/19/2019  5:00 PM Edmar Lou, [address] [place] [address]   9/24/2019 4:30 PM Quiana Bustamante PT [facility]   9/25/2019  [facility]

## 2019-09-12 ENCOUNTER — HOSPITAL ENCOUNTER (OUTPATIENT)
Dept: PHYSICAL THERAPY | Age: 59
Discharge: HOME | End: 2019-09-12
Payer: COMMERCIAL

## 2019-09-12 PROCEDURE — 97014 ELECTRIC STIMULATION THERAPY: CPT

## 2019-09-12 PROCEDURE — 97110 THERAPEUTIC EXERCISES: CPT

## 2019-09-12 PROCEDURE — 97530 THERAPEUTIC ACTIVITIES: CPT

## 2019-09-12 NOTE — PROGRESS NOTES
5: PHYSICAL THERAPY - DAILY TREATMENT NOTE    Patient Name: Chinyere Disla        Date: 2019  : 1960   YES Patient  Verified  Visit #:     Insurance: Payor: Radha Pemberton / Plan: Clary Sanches / Product Type: JENNA /      In time: 4:30 Out time: 5:30   Total Treatment Time: 60     TREATMENT AREA = Neck pain [M54.2]  Right leg pain [M79.604]  Low back pain [M54.5]    SUBJECTIVE    Pain Level (on 0 to 10 scale):  3  / 10   Medication Changes/New allergies or changes in medical history, any new surgeries or procedures? NO    If yes, update Summary List   Subjective Functional Status/Changes:  []  No changes reported     \"Since Tuesday I've been getting a different pain in that low back. Not soreness. I was afraid to do any exercises \"           OBJECTIVE  Therapeutic Procedures:  Min Procedure Specifics + Rationale   n/a [x]  Patient Education (performed throughout session) [x] Review HEP    [] Progressed/Changed HEP based on:   [] proper performance and advancement of Therex/TA   [] reduction in pain level    [] increased functional capacity       [] change in directional preference   35 [x] Therapeutic Exercise   [x]  See Flowsheet   Rationale: increase ROM and increase strength to improve the patients ability to participate in ADL's    10 [x] Therapeutic Activity   [x]  See Flowsheet; Loaded carries with KB in various positons  Rationale: To improve safety, proprioception, coordination, and efficiency with tasks       Modality rationale: decrease inflammation, decrease pain, increase tissue extensibility and increase muscle contraction/control to improve the patients ability to perform ADL's with greater ease     Min Type Additional Details   15 [x] E-Stim Type:  IFC  Attended? NO         Location:L/S  [] supine              [x] prone  [] legs elevated   [x] legs flat  [x]w/heat  []w/ice  [] sitting   [x] Skin assessment post-treatment:  [x]intact [x]redness- no adverse reaction []redness  adverse reaction:     Other Objective/Functional Measures:    Revised therex per flow sheet. Educated patient on biomechanics and planks  Provided new hep (Tasia series) and aquatherapy     Post Treatment Pain Level (on 0 to 10) scale:   0  / 10     ASSESSMENT    Assessment/Changes in Function:     Able to proceed in therex and alleviate pain despite recent exacerbation         Patient will continue to benefit from skilled PT services to instruct in home and community integration  to attain remaining goals   Progress toward goals / Updated goals:    Improving despite exacerbation     PLAN    [x]  Upgrade activities as tolerated  [x]  Update interventions per flow sheet YES Continue plan of care   []  Discharge due to :    []  Other:      Therapist: Radha Siddiqi \"BJ\" [address], DPT, Morris Yeh. MDT, Cert. DN, Cert.  SMT    Date: 9/12/2019 Time: 4:46 PM     Future Appointments   Date Time Provider Trace Muñoz   9/17/2019  4:30 PM Jazzy Bravo [facility]   9/19/2019  5:00 PM Jazzy Bravo, [address] [place] [address]   9/24/2019  4:30 PM Jazzy Bravo PT [facility]   9/25/2019  4:30 PM Ralph Stewart [facility]

## 2019-09-17 ENCOUNTER — HOSPITAL ENCOUNTER (OUTPATIENT)
Dept: PHYSICAL THERAPY | Age: 59
Discharge: HOME | End: 2019-09-17
Payer: COMMERCIAL

## 2019-09-17 PROCEDURE — 97110 THERAPEUTIC EXERCISES: CPT

## 2019-09-17 PROCEDURE — 97530 THERAPEUTIC ACTIVITIES: CPT

## 2019-09-17 NOTE — PROGRESS NOTES
PHYSICAL THERAPY - DAILY TREATMENT NOTE    Patient Name: Danya Johnson        Date: 2019  : 1960   YES Patient  Verified  Visit #:     Insurance: Payor: Claire Bonilla / Plan: Nishant Cue / Product Type: JENNA /      In time: 4:27 Out time: 5:00   Total Treatment Time: 33     TREATMENT AREA = Neck pain [M54.2]  Right leg pain [M79.604]  Low back pain [M54.5]    SUBJECTIVE    Pain Level (on 0 to 10 scale):  0  / 10   Medication Changes/New allergies or changes in medical history, any new surgeries or procedures? NO    If yes, update Summary List   Subjective Functional Status/Changes:  []  No changes reported     \"I felt great over the weekend. My abs felt great and sore. I was able to swim twice without any problems \"          OBJECTIVE  Therapeutic Procedures:  Min Procedure Specifics + Rationale   n/a [x]  Patient Education (performed throughout session) [x] Review HEP    [] Progressed/Changed HEP based on:   [] proper performance and advancement of Therex/TA   [] reduction in pain level    [] increased functional capacity       [] change in directional preference   23 [x] Therapeutic Exercise   [x]  See Flowsheet   Rationale: increase ROM and increase strength to improve the patients ability to participate in ADL's    10 [x] Therapeutic Activity   [x]  See Flowsheet; Re-assessment of functional status and FOTO  Rationale:  To improve safety, proprioception, coordination, and efficiency with tasks         Other Objective/Functional Measures:    See DC     Post Treatment Pain Level (on 0 to 10) scale:   0  / 10     ASSESSMENT    Assessment/Changes in Function:     See DC         Patient will continue to benefit from skilled PT services to n/a  to attain remaining goals   Progress toward goals / Updated goals:    See DC     PLAN    [x]  Upgrade activities as tolerated  [x]  Update interventions per flow sheet NO Continue plan of care   []  Discharge due to :    []  Other: Therapist: Nick Lamb \"BJ\" Nicki Hernandez, DPT, Cert. MDT, Cert. DN, Cert.  SMT    Date: 9/17/2019 Time: 4:41 PM     Future Appointments   Date Time Provider Trace Muñoz   9/19/2019  5:00 PM Garett Reyes, [address] [place] [address]   9/24/2019  4:30 PM Garett Reyes, [facility]   9/25/2019  4:30 PM Ya Gonzales PTA [facility]

## 2019-09-19 ENCOUNTER — APPOINTMENT (OUTPATIENT)
Dept: PHYSICAL THERAPY | Age: 59
End: 2019-09-19
Payer: COMMERCIAL

## 2019-09-19 NOTE — PROGRESS NOTES
[address]  [facility] PHYSICAL THERAPY  [address] [facility] [address], [address] - Phone: [phone]  Fax: [phone]  DISCHARGE SUMMARY  Patient Name: Manisha Calderón : 1960   Treatment/Medical Diagnosis: Neck pain [M54.2]  Right leg pain [M79.604]  Low back pain [M54.5]   Referral Source: Rosario Shepard MD     Date of Initial Visit: 7/3/19 Attended Visits: 16 Missed Visits: 0     SUMMARY OF TREATMENT  Patient's POC has consisted of therex, therapeutic activities, manual therapy prn, modalities prn, pt. education, and a comprehensive HEP. Treatment strategies used to address functional mobility deficits, ROM deficits, strength deficits, analyze and address soft tissue restrictions, analyze and cue movement patterns, analyze and modify body mechanics/ergonomics, assess and modify postural abnormalities and instruct in home and community integration. CURRENT STATUS  Patient has returned to recreational swimming and is able to turn her neck bilaterally for her strokes. She is fully independent in her ability to address any recurrence of symptoms, and denies any paraesthesia to any extremity. Patient is able to lift and carry 20# properly. MMT to B Shoulder Girdle and B Hip Girdle are 5/5 grossly, indicating improved proximal stability. GOALS/MEASURE OF PROGRESS Goal Met? 1. Pt will be independent with HEP at D/C for self management. 2. Pt will report the ability to amb 1 mile without increased pain for return to recreation. 3.  Pt will increase FS FOTO L/S Score to >/= 59 to indicate a significant decrease in functional disability  4. Pt will increase FS FOTO C/S Score to >/= 63 to indicate a significant decrease in functional disability MET    MET      MET (83)      MET (82)     RECOMMENDATIONS  Discontinue therapy. Progressing towards or have reached established goals. If you have any questions/comments please contact us directly at [phone]. Thank you for allowing us to assist in the care of your patient. Therapist Signature: Katie Adame \"BJ\" Beth Sales, GABRIELA, Cert. MDT, Cert. DN, Cert. SMT Date: 9/17/19   Reporting Period: n/a     Certification Period n/a Time: 10:53 AM     NOTE TO PHYSICIAN:  PLEASE COMPLETE THE ORDERS BELOW AND FAX TO   [facility]: [phone]  If you are unable to process this request in 24 hours please contact our office: [phone]    ___ I have read the above report and request that my patient continue as recommended.   ___ I have read the above report and request that my patient continue therapy with the following changes/special instructions:_________________________________________________________   ___ I have read the above report and request that my patient be discharged from therapy.      Physician Signature:        Date:     Time:

## 2019-09-24 ENCOUNTER — APPOINTMENT (OUTPATIENT)
Dept: PHYSICAL THERAPY | Age: 59
End: 2019-09-24
Payer: COMMERCIAL

## 2019-09-25 ENCOUNTER — APPOINTMENT (OUTPATIENT)
Dept: PHYSICAL THERAPY | Age: 59
End: 2019-09-25
Payer: COMMERCIAL

## 2021-04-20 ENCOUNTER — APPOINTMENT (OUTPATIENT)
Dept: PHYSICAL THERAPY | Age: 61
End: 2021-04-20

## 2021-06-04 ENCOUNTER — HOSPITAL ENCOUNTER (OUTPATIENT)
Dept: PHYSICAL THERAPY | Age: 61
Discharge: HOME | End: 2021-06-04
Payer: COMMERCIAL

## 2021-06-04 PROCEDURE — 97110 THERAPEUTIC EXERCISES: CPT

## 2021-06-04 PROCEDURE — 97162 PT EVAL MOD COMPLEX 30 MIN: CPT

## 2021-06-04 NOTE — PROGRESS NOTES
PHYSICAL THERAPY - DAILY TREATMENT NOTE    Patient Name: Cheli Andino        Date: 2021  : 1960   YES Patient  Verified  Visit #:   1     Insurance: Payor: Misa Ya / [place]: [place] / Product Type: PPO /      In time: 845 Out time: 930   Total Treatment Time: 45     Medicare/BCBS Time Tracking (below)   Total Timed Codes (min):  NA 1:1 Treatment Time:  NA     TREATMENT AREA =  Right Foot    SUBJECTIVE    Pain Level (on 0 to 10 scale):  2  / 10   Medication Changes/New allergies or changes in medical history, any new surgeries or procedures? NO    If yes, update Summary List   Subjective Functional Status/Changes:  []  No changes reported   CC: Right Foot  HPI: Pt reports falling down steps on 2021 and injured right ankle. Its bruised and swelled. However wasn't painful. Then after 2 weeks she got a X-ray and was found that she had a fracture. She was placed in a hard cast for a week until seeing orthopedic on . Orthopedic reported she didn't need surgery and kept her in a boot for 3 weeks. The boot exacerbated her back pain which she has a history of. She obtain a lift on left LE due to length discrepancy. On May 4th, she was placed in a soft brace which she is in now. Symptoms: lateral mid foot to anterolateral distal 1/3 of tibia. 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    Pt Goals: \"exercises\"     OBJECTIVE    Physical Therapy Evaluation - Ankle/Foot        OBJECTIVE    Posture:     Gait:                                    [] Normal    [] Abnormal    [] Antalgic    [] NWB    Device: Soft Brace                                              Describe:   Squats: full depth but pain at end range                                                    AROM Strength (1-5)      Left Right Left Right   [facility]     Extension [facility] WFL 5 5   Ankle Plantarflexion  35 25  5 (gross) 5 (gross)    Inversion 45 40 5 5    Eversion 18 18 5 5    Dorsiflexion (neutral) 20 12 5 5       Palpation: TTP dorsum of right foot  No significant swelling, rubor, or increased temperature    Balance:  SLS Right: 30\" Left\" 30\"      10 min Therapeutic Exercise:  [x]  See flow sheet   Rationale:      increase ROM and increase strength to improve the patients ability to negotiate various environments in a safe and effective manner. min Patient Education:  YES  Reviewed HEP   []  Progressed/Changed HEP based on: Other Objective/Functional Measures:    See Above  Provided HEP     Post Treatment Pain Level (on 0 to 10) scale:   2 / 10     ASSESSMENT    Assessment/Changes in Function:     See POC    Justification for Eval Code Complexity:  Patient History : High; x2 prior L/S surgeries 1992 and 1995, Migraines   Examination: Medium See Objective  Clinical Presentation: Medium Evolving  Clinical Decision Making : Med - FOTO      []  See Progress Note/Recertification   Patient will continue to benefit from skilled PT services to analyze,, cue,, progress,, modify,, demonstrate,, instruct, and address, movement patterns,, therapeutic interventions,, postural abnormalities,, soft tissue restrictions,, ROM,, strength,, functional mobility,, body mechanics/ergonomics, and home and community integration, to attain remaining goals.    Progress toward goals / Updated goals:    See POC     PLAN    []  Upgrade activities as tolerated YES Continue plan of care   []  Discharge due to :    []  Other:      Therapist: Dora Padilla DPT    Date: 6/4/2021 Time: 8:40 AM     Future Appointments   Date Time Provider Trace Muñoz   6/4/2021  8:45 AM Oleg, [address]

## 2021-06-04 NOTE — PROGRESS NOTES
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   ==========================================================================================  Assessment / key information:  Pt is a 61year old female who presents to PT today in a soft brace after suffering right ankle navicular fraction on 2021. Pt injured ankle when she fell down steps. She waited 2 weeks before seeking medical treatment due to minimal pain. She was placed in a hard cast then boot for 3 weeks. She has transitioned to soft brace on May 4th. She is currently have difficulty with descending stairs, driving, squats, and flutter kick while swimming. Patient with a Functional Status score of 52 on FOTO (Focused on Therapeutic Outcomes), which corresponds to a functional limitation of 48%. Patient will benefit from skilled PT services to address these issues. Pt was provided a HEP today and educated regarding their diagnosis and prognosis.  Thank you for this referral.   ==========================================================================================  Eval Complexity: History: HIGH Complexity :3+ comorbidities / personal factors will impact the outcome/ POC Exam:MEDIUM Complexity : 3 Standardized tests and measures addressing body structure, function, activity limitation and / or participation in recreation  Presentation: MEDIUM Complexity : Evolving with changing characteristics  Clinical Decision Making:MEDIUM Complexity : FOTO score of 26-74Overall Complexity:MEDIUM    Problem List: pain affecting function, decrease ROM, decrease strength, edema affecting function, impaired gait/ balance, decrease ADL/ functional abilitiies, decrease activity tolerance, decrease flexibility/ joint mobility and decrease transfer abilities   Treatment Plan may include any combination of the following: Therapeutic exercise, Therapeutic activities, Neuromuscular re-education, Physical agent/modality, Gait/balance training, Manual therapy, Patient education, Self Care training, Functional mobility training, Home safety training and Stair training  Patient / Family readiness to learn indicated by: asking questions, trying to perform skills and interest  Persons(s) to be included in education: patient (P)  Barriers to Learning/Limitations: None  Patient Goal (s): \"exercises\"   Patient self reported health status: good  Rehabilitation Potential: good   Short Term Goals: To be accomplished in  3  weeks:  1. Pt will be compliant with HEP for symptom management at home. 2. Pt will be able to descend 3 inch step with minimal discomfort to improve rehabilitative outcomes.  Long Term Goals: To be accomplished in  6  weeks:  1. Pt will be independent with HEP at D/C for self management. 2. Pt will demonstrate at least 20 single leg heel raises on right LE to improve stair negotiation. 3. Pt will be able to descend 6\" steps with minimal/no discomfort to improve quality of life. 4. Pt will increase FOTO Functional Status score to 70 to decrease functional limitations. 5. Pt will report 75% improvement in condition to engage in age appropriate activities.    Frequency / Duration:   Patient to be seen  1-2 times per week for 6  weeks:  Patient / Caregiver education and instruction: provided education regarding diagnosis and prognosis as well as details regarding treatment plain. activity modification, brace/ splint application and exercises  Therapist Signature: Randa Sanon DPT Date: 6/1/2357   Certification Period: NA Time: 9:42 AM   ===========================================================================================  I certify that the above Physical Therapy Services are being furnished while the patient is under my care. I agree with the treatment plan and certify that this therapy is necessary. Physician Signature:        Date:       Time:     Sowmya Ren MD  Please sign and return to [employer] or you may fax the signed copy to [phone]. Thank you.

## 2021-06-10 ENCOUNTER — HOSPITAL ENCOUNTER (OUTPATIENT)
Dept: PHYSICAL THERAPY | Age: 61
Discharge: HOME | End: 2021-06-10
Payer: COMMERCIAL

## 2021-06-10 PROCEDURE — 97110 THERAPEUTIC EXERCISES: CPT

## 2021-06-10 NOTE — PROGRESS NOTES
PHYSICAL THERAPY - DAILY TREATMENT NOTE    Patient Name: Francyne Apgar        Date: 6/10/2021  : 1960   YES Patient  Verified  Visit #:   2   of     Insurance: Payor: Jed Fofana / Plan: Alexis Mabry PPO / Product Type: PPO /      In time: 930 Out time: 1007   Total Treatment Time: 37     TREATMENT AREA =  Foot pain, right [M79.671]    SUBJECTIVE    Pain Level (on 0 to 10 scale):  0  / 10   Medication Changes/New allergies or changes in medical history, any new surgeries or procedures? NO    If yes, update Summary List   Subjective Functional Status/Changes:  []  No changes reported     Pt reports that she has been feeling good after starting her exercises. She states she wishes she started therapy earlier. OBJECTIVE  Therapeutic Procedures:  Min Procedure Specifics + Rationale   37() [x] Therapeutic Exercise    See Flowsheet   Rationale: increase ROM and increase strength to improve the patients ability to participate in ADL's              min Patient Education:  YES Reviewed HEP         Other Objective/Functional Measures:    See flowsheet for more details    Patient Education regarding the following during session:  1.updated HEP  2. Obtain referral for LBP    Increased LBP after standing TR     VC and demos required throughout session for proper form and increased safety         Post Treatment Pain Level (on 0 to 10) scale:   0  / 10     ASSESSMENT    Assessment/Changes in Function:     Tolerated session well and required no VC for HEP exercises from 1st session. []  See Progress Note/Recertification   Patient will continue to benefit from skilled PT services to analyze,, cue,, progress,, modify,, demonstrate,, instruct, and address, movement patterns,, therapeutic interventions,, postural abnormalities,, soft tissue restrictions,, ROM,, strength,, functional mobility,, body mechanics/ergonomics, and home and community integration, to attain remaining goals.    Progress toward goals / Updated goals:    1st session since initial evaluation, no notable progress yet     PLAN    [x]  Upgrade activities as tolerated YES Continue plan of care   []  Discharge due to :    []  Other:      Therapist: Sunshine Paris DPT    Date: 6/10/2021 Time: 9:30 AM     Future Appointments   Date Time Provider Trace Muñoz   6/17/2021  9:30 AM [facility] [address]   6/24/2021  9:30 AM Oleg, [address] [facility]

## 2021-06-17 ENCOUNTER — HOSPITAL ENCOUNTER (OUTPATIENT)
Dept: PHYSICAL THERAPY | Age: 61
Discharge: HOME | End: 2021-06-17
Payer: COMMERCIAL

## 2021-06-17 PROCEDURE — 97110 THERAPEUTIC EXERCISES: CPT

## 2021-06-17 NOTE — PROGRESS NOTES
PHYSICAL THERAPY - DAILY TREATMENT NOTE    Patient Name: Brayden Beltran        Date: 2021  : 1960   YES Patient  Verified  Visit #:   3   of     Insurance: Payor: Royal Cerda / Plan: [place] PPO / Product Type: PPO /      In time: 930 Out time: 1012   Total Treatment Time: 42     Medicare/[facility] Time Tracking (below)   Total Timed Codes (min):  NA 1:1 Treatment Time:  NA     TREATMENT AREA =  Foot pain, right [M79.671]    SUBJECTIVE    Pain Level (on 0 to 10 scale):  2  / 10   Medication Changes/New allergies or changes in medical history, any new surgeries or procedures? NO    If yes, update Summary List   Subjective Functional Status/Changes:  []  No changes reported     Pt reports that she felt great after last appointment. However on Tuesday, she started have increased pain at night. Attributes increased symptoms to yard work performed during the weekend. Compliance with HEP  Yes [x] No []         OBJECTIVE  Therapeutic Procedures:  Min Procedure Specifics + Rationale   42 [x] Therapeutic Exercise    See Flowsheet   Rationale: increase ROM and increase strength to improve the patients ability to participate in ADL's         min Patient Education:  YES Reviewed HEP         Other Objective/Functional Measures:    See flowsheet for more details    TTP: Right ATFL  No p! With PROM in pf, inv, ev, or df. No p! Resisted pf, in, ev,  P! Resistive DF    Added long sitting DF stretch, child's pose, Ankle 4 way to HEP  Performed pro-stretch seated      Mod/min VC and demos required throughout session for proper form and increased safety         Post Treatment Pain Level (on 0 to 10) scale:   2  / 10     ASSESSMENT    Assessment/Changes in Function:     Remains compliant with HEP, motivate during session, limited by availability for appointments.       []  See Progress Note/Recertification   Patient will continue to benefit from skilled PT services to analyze,, cue,, progress,, modify,, demonstrate,, instruct, and address, movement patterns,, therapeutic interventions,, postural abnormalities,, soft tissue restrictions,, ROM,, strength,, functional mobility,, body mechanics/ergonomics, and home and community integration, to attain remaining goals. Progress toward goals / Updated goals: · Short Term Goals: To be accomplished in  3  weeks:  1. Pt will be compliant with HEP for symptom management at home. MET   2. Pt will be able to descend 3 inch step with minimal discomfort to improve rehabilitative outcomes. MET  · Long Term Goals: To be accomplished in  6  weeks:  1. Pt will be independent with HEP at D/C for self management. 2. Pt will demonstrate at least 20 single leg heel raises on right LE to improve stair negotiation. 3. Pt will be able to descend 6\" steps with minimal/no discomfort to improve quality of life. 4. Pt will increase FOTO Functional Status score to 70 to decrease functional limitations. 5. Pt will report 75% improvement in condition to engage in age appropriate activities.       PLAN    [x]  Upgrade activities as tolerated YES Continue plan of care   []  Discharge due to :    []  Other:      Therapist: Mariela Fox DPT    Date: 6/17/2021 Time: 8:50 AM     Future Appointments   Date Time Provider Trace Muñoz   6/17/2021  9:30 AM [facility]   6/24/2021  9:30 AM Anuja Dejes[address]

## 2021-06-24 ENCOUNTER — HOSPITAL ENCOUNTER (OUTPATIENT)
Dept: PHYSICAL THERAPY | Age: 61
Discharge: HOME | End: 2021-06-24
Payer: COMMERCIAL

## 2021-06-24 PROCEDURE — 97110 THERAPEUTIC EXERCISES: CPT

## 2021-06-24 NOTE — PROGRESS NOTES
PHYSICAL THERAPY - DAILY TREATMENT NOTE    Patient Name: Renee Savage        Date: 2021  : 1960   YES Patient  Verified  Visit #:   4   of     Insurance: Payor: Josse Buckley / Plan: [place] PPO / Product Type: PPO /      In time: 929 Out time: 1010   Total Treatment Time: 41     Medicare/[facility] Time Tracking (below)   Total Timed Codes (min):  NA 1:1 Treatment Time:  NA     TREATMENT AREA =  Foot pain, right [M79.671]     SUBJECTIVE    Pain Level (on 0 to 10 scale):  0  / 10   Medication Changes/New allergies or changes in medical history, any new surgeries or procedures? NO    If yes, update Summary List   Subjective Functional Status/Changes:  []  No changes reported     Pt states that she has been feeling good recently since she has avoided yard work and any other activities that exacerbate her symptoms. Pt states that she would like to complete a 2 week hold to assess symptoms. Compliance with HEP  Yes [x] No []         OBJECTIVE  Therapeutic Procedures:  Min Procedure Specifics + Rationale   41 [x] Therapeutic Exercise    See Flowsheet   Rationale: increase ROM and increase strength to improve the patients ability to participate in ADL's       min Patient Education:  YES Reviewed HEP         Other Objective/Functional Measures:    See flowsheet for more details    Patient Education regarding the following during session:  1.update HEP       VC and demos required throughout session for proper form and increased safety         Post Treatment Pain Level (on 0 to 10) scale:   0  / 10     ASSESSMENT    Assessment/Changes in Function:     Pt will be placed on two hold to assess return to PLOF. Pt requires minimal VC for routine movements.       []  See Progress Note/Recertification   Patient will continue to benefit from skilled PT services to analyze,, cue,, progress,, modify,, demonstrate,, instruct, and address, movement patterns,, therapeutic interventions,, postural abnormalities,, soft tissue restrictions,, ROM,, strength,, functional mobility,, body mechanics/ergonomics, and home and community integration, to attain remaining goals. Progress toward goals / Updated goals:            Goal/Measure of Progress Goal Met? 1. Pt will be compliant with HEP for symptom management at home. Status at last Eval: NA Current Status: Compliant c HEP MET   2. Pt will be able to descend 3 inch step with minimal discomfort to improve rehabilitative outcomes   Status at last Eval: Unable  Current Status: 6\" descents with ease MET   3. Pt will demonstrate at least 20 single leg heel raises on right LE to improve stair negotiation. Status at last Eval: unable Current Status: 20 SL HR's MET      4. Pt will be able to descend 6\" steps with minimal/no discomfort to improve quality of life. Status at last Eval: unable Current Status: Able to with minimal discomfort MET   5. Pt will report 75% improvement in condition to engage in age appropriate activities. Status at last Eval: NA Current Status: 90%  MET      6. Pt will be independent with HEP at D/C for self management. Status at last Eval: NA Current Status: Updated HEP Today NA   7. Pt will increase FOTO Functional Status score to 70 to decrease functional limitations. Status at last Eval: 52/100 Current Status: 72/100 MET           PLAN    [x]  Upgrade activities as tolerated YES Continue plan of care   []  Discharge due to :    []  Other: DC if no additional appointments are made in 2 weeks.      Therapist: Jaqueline Joseph DPT    Date: 6/24/2021 Time: 9:25 AM     Future Appointments   Date Time Provider Trace Muñoz   6/24/2021  9:30 AM [facility] Alex[facility]

## 2021-07-22 NOTE — PROGRESS NOTES
[facility] PHYSICAL THERAPY  [address] [place] [address] Nasreen Mittal, [address] - Phone: [phone]  Fax: [phone]  DISCHARGE SUMMARY FOR PHYSICAL THERAPY          Patient Name: Francyne Apgar : 1960   Treatment/Medical Diagnosis: Foot pain, right [M79.671]   Referral Source: Katherin Dumont MD [facility]): 2021   Prior Hospitalization: See Medical History Provider #: [id number]   Medications: Verified on Patient Summary List   Visits from [facility]: 3 Missed Visits: 0     GOALS                Goal/Measure of Progress Goal Met? 1.  Pt will be compliant with HEP for symptom management at home. Status at last Eval: NA Current Status: Compliant c HEP MET   2.  Pt will be able to descend 3 inch step with minimal discomfort to improve rehabilitative outcomes   Status at last Eval: Unable  Current Status: 6\" descents with ease MET   3.  Pt will demonstrate at least 20 single leg heel raises on right LE to improve stair negotiation. Status at last Eval: unable Current Status: 20 SL HR's MET              4.  Pt will be able to descend 6\" steps with minimal/no discomfort to improve quality of life. Status at last Eval: unable Current Status: Able to with minimal discomfort MET   5.  Pt will report 75% improvement in condition to engage in age appropriate activities. Status at last Eval: NA Current Status: 90%  MET              6.  Pt will be independent with HEP at D/C for self management. Status at last Eval: NA Current Status: Updated HEP Today NA   7.  Pt will increase FOTO Functional Status score to 70 to decrease functional limitations. Status at last Eval: 52/100 Current Status: 72/100 MET               SUMMARY OF TREATMENT  Patient's POC has consisted of therex, therapeutic activities, manual therapy prn, modalities prn, pt. education, and a comprehensive HEP.  Treatment strategies used to address functional mobility deficits, ROM deficits, strength deficits, analyze and address soft tissue restrictions, analyze and cue movement patterns, analyze and modify body mechanics/ergonomics, assess and modify postural abnormalities and instruct in home and community integration. Key Functional Changes/Progress: Darío Le requested to be placed on a 2 week hold on 6/24/2021 to assess symptoms. She agreed to being self-discharged if no additional appointments were made. She was provided a HEP and encourage to continue therapy at home. She has not scheduled any additional appointments and will be discharged. She has met most of her goals and was not reporting any pain her last visit. Assessments/Recommendations: Discontinue therapy. Progressing towards or have reached established goals. If you have any questions/comments please contact us directly at [phone]. Thank you for allowing us to assist in the care of your patient. Therapist Signature:  Arturo Andrade DPT Date: 7/22/2021   Reporting Period: NA Time: 7:20 PM      Certification Period: NA       NOTE TO PHYSICIAN:  PLEASE COMPLETE THE ORDERS BELOW AND FAX TO   [facility]: [phone]. If you are unable to process this request in 24 hours please contact our office: [phone].    ___ I have read the above report and request that my patient be discharged from therapy.      Physician Signature:        Date:       Time:    Aster Perez MD

## 2023-08-14 ENCOUNTER — HOSPITAL ENCOUNTER (OUTPATIENT)
Facility: HOSPITAL | Age: 63
Setting detail: RECURRING SERIES
Discharge: HOME | End: 2023-08-17
Payer: COMMERCIAL

## 2023-08-14 PROCEDURE — 97161 PT EVAL LOW COMPLEX 20 MIN: CPT

## 2023-08-14 NOTE — PROGRESS NOTES
PT DAILY TREATMENT NOTE/CERVICAL [id number]      Patient Name: Marivel Alvarez    Date: 2023    : 1960  Insurance: Payor: Ronaldo Velasquezt / Plan: OPTIMA PPO / Product Type: *No Product type* /      Patient  verified yes     Visit #   Current / Total 1 5-10   Time   In / Out 200 235   Pain   In / Out 0 0   Subjective Functional Status/Changes: See POC     Treatment Area: Cervical pain (neck) [M54.2]    Modalities Rationale:     decrease pain and increase tissue extensibility to improve patient's ability to progress to PLOF and address remaining functional goals. min [] Estim Unattended, type/location:                                      []  w/ice    []  w/heat    min [] Estim Attended, type/location:                                     []  w/US     []  w/ice    []  w/heat    []  TENS insruct     7 min [x]  Cervical Mechanical Traction: type/lbs 12lbs/5 lbs 20 deg angle                  []  pro   [x]  sup   [x]  int   []  cont    []  before manual    []  after manual    min []  Ultrasound, settings/location:      min []  Iontophoresis w/ dexamethasone, location:                                               []  take home patch       []  in clinic    min  unbilled []  Ice     []  Heat    location/position:     min []  Vasopneumatic Device, press/temp:    If using vaso (only need to measure limb vaso being performed on)      pre-treatment girth :       post-treatment girth :       measured at (landmark location) :      min []  Other:    Skin assessment post-treatment (if applicable):    [x]  intact    []  redness- no adverse reaction                  []redness - adverse reaction:          21 min [x]Eval - untimed                      Therapeutic Procedures:   Tx Min Billable or 1:1 Min (if diff from Tx Min) Procedure, Rationale, Specifics   7  [id number] Therapeutic Exercise (timed):  increase ROM, strength, coordination, balance, and proprioception to improve patient's ability to progress to PLOF and address

## 2023-08-14 NOTE — PROGRESS NOTES
[facility] PHYSICAL THERAPY  [address] [place] [address]. 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 Symptoms began when she suddenly did a  left cervical rotation experience sharp pain. Completed HEP that was provided during her last episode of care with some benefit. However in July, symptoms were reproduced. She had completed physical therapy in the past for cervical and lumbar pain. The resulting condition has affected their ability to drive and scan her environment (left rotation). Patient with a Functional Status score of 46 on FOTO (Focused on Therapeutic Outcomes), which corresponds to a functional limitation of 54%. Patient will benefit from skilled PT services to address these issues. Pt was educated regarding their diagnosis and prognosis.  Thank you for this referral.     Cervical ROM AROM   Forward flexion WFL   Extension 40   SB right 24   SB left  28   Rotation right 52   Rotation left 42     UA AROM: WFL    Strength:                                                                      L (1-5) R (1-5)   Flexors 5 5   Abductors 5 5   External Rotators 5 5   Internal Rotators 5 5   Supraspinatus 5 5   Serratus

## 2023-08-29 ENCOUNTER — HOSPITAL ENCOUNTER (OUTPATIENT)
Facility: HOSPITAL | Age: 63
Setting detail: RECURRING SERIES
Discharge: HOME | End: 2023-09-01
Payer: COMMERCIAL

## 2023-08-29 PROCEDURE — 97112 NEUROMUSCULAR REEDUCATION: CPT

## 2023-08-29 PROCEDURE — 97530 THERAPEUTIC ACTIVITIES: CPT

## 2023-08-29 PROCEDURE — 97012 MECHANICAL TRACTION THERAPY: CPT

## 2023-08-29 PROCEDURE — 97110 THERAPEUTIC EXERCISES: CPT

## 2023-08-29 NOTE — PROGRESS NOTES
PHYSICAL / OCCUPATIONAL THERAPY - DAILY TREATMENT NOTE (updated )    Patient Name: Munira Vicente    Date: 2023    : 1960  Insurance: Payor: Frandy Him / Plan: OPTIMA PPO / Product Type: *No Product type* /      Patient  verified Yes     Visit #   Current / Total 2 5-10   Time   In / Out 5:15 5:55   Pain   In / Out 0 0   Subjective Functional Status/Changes: Pt reports that she has noticed increased mobility in her neck with exercises prescribed at evaluation. Next PN/ RC due PN 23  Auth due 15 visits, 23-23    TREATMENT AREA =  Cervical pain (neck) [M54.2]    OBJECTIVE    Modalities Rationale:     decrease pain and increase tissue extensibility to improve patient's ability to progress to PLOF and address remaining functional goals. min [] Estim Unattended, type/location:                                      []  w/ice    []  w/heat    min [] Estim Attended, type/location:                                     []  w/US     []  w/ice    []  w/heat    []  TENS insruct     12 min []  Mechanical Traction: type/lbs C/S, supine with wedge under B LE, 15# max/5# min, 60 sec hold/20 sec rest, 20 deg angle                  []  pro   [x]  sup   [x]  int   []  cont    []  before manual    []  after manual    min []  Ultrasound, settings/location:      min  unbill []  Ice     []  Heat    location/position:     min []  Paraffin,  details:     min []  Vasopneumatic Device, press/temp:     min []  Aubrie Salon / Delos Overcast: If using vaso (only need to measure limb vaso being performed on)      pre-treatment girth :       post-treatment girth :       measured at (landmark location) :      min []  Other:    Skin assessment post-treatment:  Intact      Therapeutic Procedures:     Tx Min Billable or 1:1 Min (if diff from Tx Min) Procedure, Rationale, Specifics   8  [id number] Therapeutic Exercise (timed):  increase ROM, strength, coordination, balance, and proprioception to improve patient's ability to

## 2023-09-05 ENCOUNTER — HOSPITAL ENCOUNTER (OUTPATIENT)
Facility: HOSPITAL | Age: 63
Setting detail: RECURRING SERIES
Discharge: HOME | End: 2023-09-08
Payer: COMMERCIAL

## 2023-09-05 PROCEDURE — 97110 THERAPEUTIC EXERCISES: CPT

## 2023-09-05 PROCEDURE — 97112 NEUROMUSCULAR REEDUCATION: CPT

## 2023-09-05 PROCEDURE — 97530 THERAPEUTIC ACTIVITIES: CPT

## 2023-09-05 NOTE — PROGRESS NOTES
PHYSICAL / OCCUPATIONAL THERAPY - DAILY TREATMENT NOTE (updated )    Patient Name: Zhao Chinchilla    Date: 2023    : 1960  Insurance: Payor: Alejandra Still / Plan: OPTIMA PPO / Product Type: *No Product type* /      Patient  verified Yes     Visit #   Current / Total 3 5-10   Time   In / Out 5:20 6   Pain   In / Out 0 0   Subjective Functional Status/Changes: Pt reports her neck has been feeling good. It's her back that's bothering her lately. She bends and then feels a pull (grabs right side of low back)     TREATMENT AREA =  Cervical pain (neck) [M54.2]    OBJECTIVE         Therapeutic Procedures: Tx Min Billable or 1:1 Min (if diff from Tx Min) Procedure, Rationale, Specifics   15  [id number] Therapeutic Exercise (timed):  increase ROM, strength, coordination, balance, and proprioception to improve patient's ability to progress to PLOF and address remaining functional goals. (see flow sheet as applicable)     Details if applicable:       15  [id number] Neuromuscular Re-Education (timed):  improve balance, coordination, kinesthetic sense, posture, core stability and proprioception to improve patient's ability to develop conscious control of individual muscles and awareness of position of extremities in order to progress to PLOF and address remaining functional goals. (see flow sheet as applicable)     Details if applicable:     10  [id number] Therapeutic Activity (timed):  use of dynamic activities replicating functional movements to increase ROM, strength, coordination, balance, and proprioception in order to improve patient's ability to progress to PLOF and address remaining functional goals.   (see flow sheet as applicable)     Details if applicable:  testing/discussion of low back symptoms for treatment          Details if applicable:            Details if applicable:     40  [facility] Totals Reminder: bill using total billable min of TIMED therapeutic procedures (example: do not include dry needle or estim

## 2023-09-11 ENCOUNTER — HOSPITAL ENCOUNTER (OUTPATIENT)
Facility: HOSPITAL | Age: 63
Setting detail: RECURRING SERIES
End: 2023-09-11
Payer: COMMERCIAL

## 2023-09-18 ENCOUNTER — HOSPITAL ENCOUNTER (OUTPATIENT)
Facility: HOSPITAL | Age: 63
Setting detail: RECURRING SERIES
Discharge: HOME | End: 2023-09-21
Payer: COMMERCIAL

## 2023-09-18 PROCEDURE — 97530 THERAPEUTIC ACTIVITIES: CPT

## 2023-09-18 PROCEDURE — 97112 NEUROMUSCULAR REEDUCATION: CPT

## 2023-09-18 PROCEDURE — 97110 THERAPEUTIC EXERCISES: CPT

## 2023-09-18 NOTE — PROGRESS NOTES
PHYSICAL / OCCUPATIONAL THERAPY - DAILY TREATMENT NOTE (updated )    Patient Name: Eddi Palacios    Date: 2023    : 1960  Insurance: Payor: Michaela Sorto / Plan: OPTIMA PPO / Product Type: *No Product type* /      Patient  verified Yes     Visit #   Current / Total 4 5-10   Time   In / Out 5:20 6   Pain   In / Out 0 0   Subjective Functional Status/Changes: Pt reports her neck is fine, but her right side back still hurts when she bends over. She went hiking yesterday and felt fine. TREATMENT AREA =  Cervical pain (neck) [M54.2]    OBJECTIVE         Therapeutic Procedures: Tx Min Billable or 1:1 Min (if diff from Tx Min) Procedure, Rationale, Specifics   23  [id number] Neuromuscular Re-Education (timed):  improve balance, coordination, kinesthetic sense, posture, core stability and proprioception to improve patient's ability to develop conscious control of individual muscles and awareness of position of extremities in order to progress to PLOF and address remaining functional goals. (see flow sheet as applicable)     Details if applicable:       9  [id number] Therapeutic Exercise (timed):  increase ROM, strength, coordination, balance, and proprioception to improve patient's ability to progress to PLOF and address remaining functional goals. (see flow sheet as applicable)     Details if applicable:     8  [id number] Therapeutic Activity (timed):  use of dynamic activities replicating functional movements to increase ROM, strength, coordination, balance, and proprioception in order to improve patient's ability to progress to PLOF and address remaining functional goals.   (see flow sheet as applicable)     Details if applicable:            Details if applicable:            Details if applicable:     36  [facility] Totals Reminder: bill using total billable min of TIMED therapeutic procedures (example: do not include dry needle or estim unattended, both untimed codes, in totals to left)  8-22 min = 1 unit; 23-37 min
your patient.   PTA Signature Oscar Farnsworth, PT     Therapist Signature: Oscar Farnsworth PT Date: 9/18/2023   Reporting Period  08/14/2023 - 09/18/2023 Time: 5:56 PM

## 2023-09-26 ENCOUNTER — HOSPITAL ENCOUNTER (OUTPATIENT)
Facility: HOSPITAL | Age: 63
Setting detail: RECURRING SERIES
Discharge: HOME | End: 2023-09-29
Payer: COMMERCIAL

## 2023-09-26 ENCOUNTER — TELEPHONE (OUTPATIENT)
Facility: HOSPITAL | Age: 63
End: 2023-09-26

## 2023-09-26 PROCEDURE — 97112 NEUROMUSCULAR REEDUCATION: CPT

## 2023-09-26 PROCEDURE — 97530 THERAPEUTIC ACTIVITIES: CPT

## 2023-09-26 PROCEDURE — 97110 THERAPEUTIC EXERCISES: CPT

## 2023-09-26 NOTE — PROGRESS NOTES
PHYSICAL / OCCUPATIONAL THERAPY - DAILY TREATMENT NOTE (updated )    Patient Name: Kady Dunne    Date: 2023    : 1960  Insurance: Payor: Marycarmen Crenshaw / Plan: OPTIMA PPO / Product Type: *No Product type* /      Patient  verified Yes     Visit #   Current / Total 5 5-10   Time   In / Out 4:27 5:07   Pain   In / Out 0 0   Subjective Functional Status/Changes: Pt knows she's improving but the right side of her low back still grabs her. It happened when she got up out of a chair. When it started getting back again 1-2 months ago, she had pain everytime she moves. TREATMENT AREA =  Cervical pain (neck) [M54.2]    OBJECTIVE         Therapeutic Procedures: Tx Min Billable or 1:1 Min (if diff from Tx Min) Procedure, Rationale, Specifics   23  [id number] Neuromuscular Re-Education (timed):  improve balance, coordination, kinesthetic sense, posture, core stability and proprioception to improve patient's ability to develop conscious control of individual muscles and awareness of position of extremities in order to progress to PLOF and address remaining functional goals. (see flow sheet as applicable)     Details if applicable:       9  [id number] Therapeutic Exercise (timed):  increase ROM, strength, coordination, balance, and proprioception to improve patient's ability to progress to PLOF and address remaining functional goals. (see flow sheet as applicable)     Details if applicable:     8  23072 Therapeutic Activity (timed):  use of dynamic activities replicating functional movements to increase ROM, strength, coordination, balance, and proprioception in order to improve patient's ability to progress to PLOF and address remaining functional goals.   (see flow sheet as applicable)     Details if applicable:  attempted squats and hip hinge but stopped secondary to back pain          Details if applicable:            Details if applicable:     36  [facility] Totals Reminder: bill using total billable min of TIMED

## 2023-10-03 ENCOUNTER — HOSPITAL ENCOUNTER (OUTPATIENT)
Facility: HOSPITAL | Age: 63
Setting detail: RECURRING SERIES
Discharge: HOME | End: 2023-10-06
Payer: COMMERCIAL

## 2023-10-03 PROCEDURE — 97110 THERAPEUTIC EXERCISES: CPT

## 2023-10-03 PROCEDURE — 97530 THERAPEUTIC ACTIVITIES: CPT

## 2023-10-03 PROCEDURE — 97112 NEUROMUSCULAR REEDUCATION: CPT

## 2023-10-03 NOTE — PROGRESS NOTES
68-82 min = 5 units   Total Total     [x]  Patient Education billed concurrently with other procedures   [x] Review HEP    [] Progressed/Changed HEP, detail:    [] Other detail:       Objective Information/Functional Measures/Assessment    Pt tolerated treatment well today, learned how to do bridge with neutral spine. This was given for HEP to prepare for proper bending/lifting. Patient will continue to benefit from skilled PT / OT services to modify and progress therapeutic interventions, analyze and address functional mobility deficits, analyze and address ROM deficits, analyze and address strength deficits, analyze and address soft tissue restrictions, analyze and cue for proper movement patterns, analyze and modify for postural abnormalities, and instruct in home and community integration to address functional deficits and attain remaining goals. Progress toward goals / Updated goals:  []  See Progress Note/Recertification     Pt will be independent with HEP at D/C for self management. Status at  NA  Current: updated today 10/3/2023 progressing  2. Pt will repot 75% improvement in condition to engage in age appropriate activities. Current: pt feels 75% improved 9/26/2023 progressing  4. Pt will increase FOTO Functional Status score to 58 to decrease functional limitations.   Status at IE 55     Next PN/ RC due 10/18/2023  Auth due 15v 11/30/2023    PLAN  Yes  Continue plan of care  []  Upgrade activities as tolerated  []  Discharge due to :  []  Other:    Caitie Less, PT    10/3/2023    5:38 PM    Future Appointments   Date Time Provider [id number]   10/10/2023  4:40 PM Caitie Less, PT [facility]   10/12/2023  4:00 PM Caitie Less, PT [facility]   10/17/2023  4:40 PM Caitie Less, PT [facility]   10/24/2023  4:40 PM Caitie Less, PT [id number]

## 2023-10-10 ENCOUNTER — HOSPITAL ENCOUNTER (OUTPATIENT)
Facility: HOSPITAL | Age: 63
Setting detail: RECURRING SERIES
Discharge: HOME | End: 2023-10-13
Payer: COMMERCIAL

## 2023-10-10 PROCEDURE — 97110 THERAPEUTIC EXERCISES: CPT

## 2023-10-10 PROCEDURE — 97530 THERAPEUTIC ACTIVITIES: CPT

## 2023-10-10 PROCEDURE — 97112 NEUROMUSCULAR REEDUCATION: CPT

## 2023-10-10 NOTE — PROGRESS NOTES
PHYSICAL / OCCUPATIONAL THERAPY - DAILY TREATMENT NOTE (updated )    Patient Name: Eddi Palacios    Date: 10/10/2023    : 1960  Insurance: Payor: Michaela Sorto / Plan: OPTIMA PPO / Product Type: *No Product type* /      Patient  verified Yes     Visit #   Current / Total 7 5-10   Time   In / Out 4:40 5:20   Pain   In / Out 0 0   Subjective Functional Status/Changes: Pt reports being sore for 2 days after last session but fine since then     TREATMENT AREA =  Cervical pain (neck) [M54.2]    OBJECTIVE         Therapeutic Procedures: Tx Min Billable or 1:1 Min (if diff from Tx Min) Procedure, Rationale, Specifics   23  [id number] Neuromuscular Re-Education (timed):  improve balance, coordination, kinesthetic sense, posture, core stability and proprioception to improve patient's ability to develop conscious control of individual muscles and awareness of position of extremities in order to progress to PLOF and address remaining functional goals. (see flow sheet as applicable)     Details if applicable:       9  [id number] Therapeutic Exercise (timed):  increase ROM, strength, coordination, balance, and proprioception to improve patient's ability to progress to PLOF and address remaining functional goals. (see flow sheet as applicable)     Details if applicable:     8  [id number] Therapeutic Activity (timed):  use of dynamic activities replicating functional movements to increase ROM, strength, coordination, balance, and proprioception in order to improve patient's ability to progress to PLOF and address remaining functional goals.   (see flow sheet as applicable)     Details if applicable:            Details if applicable:            Details if applicable:     36  [facility] Totals Reminder: bill using total billable min of TIMED therapeutic procedures (example: do not include dry needle or estim unattended, both untimed codes, in totals to left)  8-22 min = 1 unit; 23-37 min = 2 units; 38-52 min = 3 units; 53-67 min = 4

## 2023-10-12 ENCOUNTER — APPOINTMENT (OUTPATIENT)
Facility: HOSPITAL | Age: 63
End: 2023-10-12
Payer: COMMERCIAL

## 2023-10-17 ENCOUNTER — APPOINTMENT (OUTPATIENT)
Facility: HOSPITAL | Age: 63
End: 2023-10-17
Payer: COMMERCIAL

## 2023-10-24 ENCOUNTER — HOSPITAL ENCOUNTER (OUTPATIENT)
Facility: HOSPITAL | Age: 63
Setting detail: RECURRING SERIES
Discharge: HOME | End: 2023-10-27
Payer: COMMERCIAL

## 2023-10-24 PROCEDURE — 97530 THERAPEUTIC ACTIVITIES: CPT

## 2023-10-24 PROCEDURE — 97110 THERAPEUTIC EXERCISES: CPT

## 2023-10-24 PROCEDURE — 97112 NEUROMUSCULAR REEDUCATION: CPT

## 2023-10-24 NOTE — PROGRESS NOTES
[facility],[address] [facility]  [address]   Phone: [phone] Fax: [phone]  Discharge Note  Patient Name: Michael Garcia : 1960   Treatment/Medical Diagnosis: Cervical pain (neck) [M54.2] Cervical pain (neck) [M54.2]   Referral Source: Carlos Correa MD     Date of Initial Visit: 2023 Attended Visits: 8 Missed Visits: 0     SUMMARY OF TREATMENT  Patient's POC has consisted of therex, therapeutic activities, manual therapy prn, modalities prn, pt. education, and a comprehensive HEP. Treatment strategies used to address functional mobility deficits, ROM deficits, strength deficits, analyze and address soft tissue restrictions, analyze and cue movement patterns, analyze and modify body mechanics/ergonomics, assess and modify postural abnormalities and instruct in home and community integration. CURRENT STATUS   Pt will be independent with HEP at D/C for self management. Status at IE NA  Current: updated today 10/10/2023 goal met  2. Pt will repot 75% improvement in condition to engage in age appropriate activities. Current: pt feels 75% improved 2023 goal met  4. Pt will increase FOTO Functional Status score to 58 to decrease functional limitations. Status at IE 55  Current: 66 10/24/2023 goal met    RECOMMENDATIONS  Pt has met her goals and is ready to be D/C to HEP at this time    If you have any questions/comments please contact us directly at [phone]   Thank you for allowing us to assist in the care of your patient.   PTA Signature Oscar Farnsworth PT     Therapist Signature: Oscar Farnsworth PT Date: 10/24/2023   Reporting Period  2023 - 10/24/2023 Time: 5:08 PM
53-67 min = 4 units; 68-82 min = 5 units   Total Total     [x]  Patient Education billed concurrently with other procedures   [x] Review HEP    [] Progressed/Changed HEP, detail:    [] Other detail:       Objective Information/Functional Measures/Assessment    Pt tolerated treatment well today, was challenged with black theraband and increased reps with hip 3 way. Progress toward goals / Updated goals:  []  See Progress Note/Recertification     Pt will be independent with HEP at D/C for self management. Status at IE NA  Current: updated today 10/10/2023 goal met  2. Pt will repot 75% improvement in condition to engage in age appropriate activities. Current: pt feels 75% improved 9/26/2023 goal met  3. Pt will increase FOTO Functional Status score to 58 to decrease functional limitations.   Status at IE 55  Current: 66 10/24/2023 goal met     Next PN/ RC due 10/18/2023  Auth due 15v 11/30/2023    PLAN  No  Continue plan of care  []  Upgrade activities as tolerated  [x]  Discharge due to : having met goals  []  Other:    aJne Flor PT    10/24/2023    4:39 PM    Future Appointments   Date Time Provider [id number]   10/24/2023  4:40 PM Jane Flor PT [facility]